# Patient Record
Sex: FEMALE | Race: BLACK OR AFRICAN AMERICAN | NOT HISPANIC OR LATINO | Employment: OTHER | URBAN - METROPOLITAN AREA
[De-identification: names, ages, dates, MRNs, and addresses within clinical notes are randomized per-mention and may not be internally consistent; named-entity substitution may affect disease eponyms.]

---

## 2023-02-27 LAB
ANION GAP IN SER/PLAS: 13 MMOL/L (ref 10–20)
CALCIUM (MG/DL) IN SER/PLAS: 10.3 MG/DL (ref 8.6–10.6)
CARBON DIOXIDE, TOTAL (MMOL/L) IN SER/PLAS: 29 MMOL/L (ref 21–32)
CHLORIDE (MMOL/L) IN SER/PLAS: 102 MMOL/L (ref 98–107)
CHOLESTEROL (MG/DL) IN SER/PLAS: 209 MG/DL (ref 0–199)
CHOLESTEROL IN HDL (MG/DL) IN SER/PLAS: 68.6 MG/DL
CHOLESTEROL/HDL RATIO: 3
CREATININE (MG/DL) IN SER/PLAS: 1.28 MG/DL (ref 0.5–1.05)
GFR FEMALE: 43 ML/MIN/1.73M2
GLUCOSE (MG/DL) IN SER/PLAS: 70 MG/DL (ref 74–99)
LDL: 118 MG/DL (ref 0–99)
POTASSIUM (MMOL/L) IN SER/PLAS: 4.4 MMOL/L (ref 3.5–5.3)
SODIUM (MMOL/L) IN SER/PLAS: 140 MMOL/L (ref 136–145)
TRIGLYCERIDE (MG/DL) IN SER/PLAS: 114 MG/DL (ref 0–149)
UREA NITROGEN (MG/DL) IN SER/PLAS: 20 MG/DL (ref 6–23)
VLDL: 23 MG/DL (ref 0–40)

## 2023-04-03 ENCOUNTER — OFFICE VISIT (OUTPATIENT)
Dept: PRIMARY CARE | Facility: CLINIC | Age: 78
End: 2023-04-03
Payer: MEDICARE

## 2023-04-03 VITALS
SYSTOLIC BLOOD PRESSURE: 158 MMHG | HEIGHT: 62 IN | HEART RATE: 68 BPM | TEMPERATURE: 97.6 F | DIASTOLIC BLOOD PRESSURE: 82 MMHG | WEIGHT: 221 LBS | BODY MASS INDEX: 40.67 KG/M2 | OXYGEN SATURATION: 97 %

## 2023-04-03 DIAGNOSIS — I10 HYPERTENSION WITH GOAL BLOOD PRESSURE LESS THAN 130/80: Primary | ICD-10-CM

## 2023-04-03 DIAGNOSIS — R79.89 ABNORMAL BLOOD CREATININE LEVEL: ICD-10-CM

## 2023-04-03 DIAGNOSIS — G89.29 CHRONIC MUSCULOSKELETAL PAIN: ICD-10-CM

## 2023-04-03 DIAGNOSIS — M79.18 CHRONIC MUSCULOSKELETAL PAIN: ICD-10-CM

## 2023-04-03 PROBLEM — R10.10 PAIN OF UPPER ABDOMEN: Status: ACTIVE | Noted: 2023-04-03

## 2023-04-03 PROBLEM — H18.519 CORNEA GUTTATA: Status: ACTIVE | Noted: 2023-04-03

## 2023-04-03 PROBLEM — R82.90 ABNORMAL URINALYSIS: Status: ACTIVE | Noted: 2023-04-03

## 2023-04-03 PROBLEM — R22.1 NECK MASS: Status: ACTIVE | Noted: 2023-04-03

## 2023-04-03 PROBLEM — H18.603 KERATOCONUS OF BOTH EYES: Status: ACTIVE | Noted: 2023-04-03

## 2023-04-03 PROBLEM — K59.00 CONSTIPATION: Status: ACTIVE | Noted: 2023-04-03

## 2023-04-03 PROBLEM — R26.9 GAIT ABNORMALITY: Status: ACTIVE | Noted: 2023-04-03

## 2023-04-03 PROBLEM — R44.8 FACIAL PRESSURE: Status: ACTIVE | Noted: 2023-04-03

## 2023-04-03 PROBLEM — H25.813 COMBINED FORM OF AGE-RELATED CATARACT, BOTH EYES: Status: ACTIVE | Noted: 2023-04-03

## 2023-04-03 PROBLEM — S39.012A LUMBAR STRAIN: Status: ACTIVE | Noted: 2023-04-03

## 2023-04-03 PROBLEM — M54.50 CHRONIC BILATERAL LOW BACK PAIN WITHOUT SCIATICA: Status: ACTIVE | Noted: 2023-04-03

## 2023-04-03 PROBLEM — M79.606 LOWER EXTREMITY PAIN: Status: ACTIVE | Noted: 2023-04-03

## 2023-04-03 PROBLEM — M85.832 OSTEOPENIA OF LEFT FOREARM: Status: ACTIVE | Noted: 2023-04-03

## 2023-04-03 PROBLEM — H52.00 HYPEROPIA WITH PRESBYOPIA: Status: ACTIVE | Noted: 2023-04-03

## 2023-04-03 PROBLEM — R69 TAKING MULTIPLE MEDICATIONS FOR CHRONIC DISEASE: Status: ACTIVE | Noted: 2023-04-03

## 2023-04-03 PROBLEM — G47.30 SLEEP DISORDER BREATHING: Status: ACTIVE | Noted: 2023-04-03

## 2023-04-03 PROBLEM — H52.213 IRREGULAR ASTIGMATISM OF BOTH EYES: Status: ACTIVE | Noted: 2023-04-03

## 2023-04-03 PROBLEM — M54.6 RIGHT-SIDED THORACIC BACK PAIN: Status: ACTIVE | Noted: 2023-04-03

## 2023-04-03 PROBLEM — R06.83 SNORING: Status: ACTIVE | Noted: 2023-04-03

## 2023-04-03 PROBLEM — G47.19 EXCESSIVE DAYTIME SLEEPINESS: Status: ACTIVE | Noted: 2023-04-03

## 2023-04-03 PROBLEM — E07.9 THYROID DISORDER: Status: ACTIVE | Noted: 2023-04-03

## 2023-04-03 PROBLEM — R19.8 ABNORMAL BOWEL HABITS: Status: ACTIVE | Noted: 2023-04-03

## 2023-04-03 PROBLEM — R13.10 DYSPHAGIA: Status: ACTIVE | Noted: 2023-04-03

## 2023-04-03 PROBLEM — E04.1 THYROID NODULE: Status: ACTIVE | Noted: 2023-04-03

## 2023-04-03 PROBLEM — R06.09 DYSPNEA ON EXERTION: Status: ACTIVE | Noted: 2023-04-03

## 2023-04-03 PROBLEM — M06.9 RHEUMATOID ARTHRITIS (MULTI): Status: ACTIVE | Noted: 2023-04-03

## 2023-04-03 PROBLEM — E66.813 CLASS 3 SEVERE OBESITY WITHOUT SERIOUS COMORBIDITY WITH BODY MASS INDEX (BMI) OF 40.0 TO 44.9 IN ADULT: Status: ACTIVE | Noted: 2023-04-03

## 2023-04-03 PROBLEM — R93.0 ABNORMAL MRI OF HEAD: Status: ACTIVE | Noted: 2023-04-03

## 2023-04-03 PROBLEM — H18.519 FUCHS' ENDOTHELIAL DYSTROPHY: Status: ACTIVE | Noted: 2023-04-03

## 2023-04-03 PROBLEM — R19.4 BOWEL HABIT CHANGES: Status: ACTIVE | Noted: 2023-04-03

## 2023-04-03 PROBLEM — R91.8 ABNORMAL LUNG FIELD: Status: ACTIVE | Noted: 2023-04-03

## 2023-04-03 PROBLEM — H25.13 NUCLEAR SENILE CATARACT OF BOTH EYES: Status: ACTIVE | Noted: 2023-04-03

## 2023-04-03 PROBLEM — Z86.010 HISTORY OF ADENOMATOUS POLYP OF COLON: Status: ACTIVE | Noted: 2023-04-03

## 2023-04-03 PROBLEM — R11.0 NAUSEA IN ADULT: Status: ACTIVE | Noted: 2023-04-03

## 2023-04-03 PROBLEM — S05.8X2A: Status: ACTIVE | Noted: 2023-04-03

## 2023-04-03 PROBLEM — I51.9 DIASTOLIC DYSFUNCTION, LEFT VENTRICLE: Status: ACTIVE | Noted: 2023-04-03

## 2023-04-03 PROBLEM — M16.10 ARTHRITIS OF HIP: Status: ACTIVE | Noted: 2023-04-03

## 2023-04-03 PROBLEM — R51.9 CHRONIC NONINTRACTABLE HEADACHE: Status: ACTIVE | Noted: 2023-04-03

## 2023-04-03 PROBLEM — M54.50 BACK PAIN, LUMBOSACRAL: Status: ACTIVE | Noted: 2023-04-03

## 2023-04-03 PROBLEM — Z86.39 HISTORY OF DIABETES MELLITUS, TYPE II: Status: ACTIVE | Noted: 2023-04-03

## 2023-04-03 PROBLEM — K14.0 GLOSSITIS: Status: ACTIVE | Noted: 2023-04-03

## 2023-04-03 PROBLEM — R93.89 CHEST X-RAY ABNORMALITY: Status: ACTIVE | Noted: 2023-04-03

## 2023-04-03 PROBLEM — R73.03 PRE-DIABETES: Status: ACTIVE | Noted: 2023-04-03

## 2023-04-03 PROBLEM — H52.00 HYPEROPIA: Status: ACTIVE | Noted: 2023-04-03

## 2023-04-03 PROBLEM — R06.02 SHORTNESS OF BREATH: Status: ACTIVE | Noted: 2023-04-03

## 2023-04-03 PROBLEM — E66.01 CLASS 3 SEVERE OBESITY WITHOUT SERIOUS COMORBIDITY WITH BODY MASS INDEX (BMI) OF 40.0 TO 44.9 IN ADULT (MULTI): Status: ACTIVE | Noted: 2023-04-03

## 2023-04-03 PROBLEM — E16.2 HYPOGLYCEMIA: Status: ACTIVE | Noted: 2023-04-03

## 2023-04-03 PROBLEM — R59.0 CERVICAL ADENOPATHY: Status: ACTIVE | Noted: 2023-04-03

## 2023-04-03 PROBLEM — M54.6 CHRONIC BILATERAL THORACIC BACK PAIN: Status: ACTIVE | Noted: 2023-04-03

## 2023-04-03 PROBLEM — M17.11 OSTEOARTHRITIS OF RIGHT KNEE: Status: ACTIVE | Noted: 2023-04-03

## 2023-04-03 PROBLEM — H16.223 BILATERAL KERATITIS SICCA: Status: ACTIVE | Noted: 2023-04-03

## 2023-04-03 PROBLEM — R09.82 POSTNASAL DRIP: Status: ACTIVE | Noted: 2023-04-03

## 2023-04-03 PROBLEM — L02.91 ABSCESS: Status: ACTIVE | Noted: 2023-04-03

## 2023-04-03 PROBLEM — Z86.0101 HISTORY OF ADENOMATOUS POLYP OF COLON: Status: ACTIVE | Noted: 2023-04-03
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
## 2023-04-03 PROBLEM — R82.90 ABNORMAL URINE: Status: ACTIVE | Noted: 2023-04-03

## 2023-04-03 PROBLEM — R73.9 HYPERGLYCEMIA: Status: ACTIVE | Noted: 2023-04-03

## 2023-04-03 PROBLEM — D12.6 TUBULAR ADENOMA OF COLON: Status: ACTIVE | Noted: 2023-04-03

## 2023-04-03 PROBLEM — R15.0 INCOMPLETE PASSAGE OF STOOL: Status: ACTIVE | Noted: 2023-04-03

## 2023-04-03 PROBLEM — G47.30 OBSERVED SLEEP APNEA: Status: ACTIVE | Noted: 2023-04-03

## 2023-04-03 PROBLEM — H04.129 DRY EYE SYNDROME: Status: ACTIVE | Noted: 2023-04-03

## 2023-04-03 PROBLEM — M85.852 OSTEOPENIA OF NECK OF LEFT FEMUR: Status: ACTIVE | Noted: 2023-04-03

## 2023-04-03 PROBLEM — M35.01 BILATERAL KERATITIS SICCA (MULTI): Status: ACTIVE | Noted: 2023-04-03

## 2023-04-03 PROBLEM — R15.9 INCONTINENCE OF FECES: Status: ACTIVE | Noted: 2023-04-03

## 2023-04-03 PROBLEM — Z20.822 EXPOSURE TO COVID-19 VIRUS: Status: ACTIVE | Noted: 2023-04-03

## 2023-04-03 PROBLEM — E66.01 MORBID OBESITY (MULTI): Status: ACTIVE | Noted: 2023-04-03

## 2023-04-03 PROBLEM — E04.2 MULTINODULAR GOITER: Status: ACTIVE | Noted: 2023-04-03

## 2023-04-03 PROBLEM — G47.61 PERIODIC LIMB MOVEMENT: Status: ACTIVE | Noted: 2023-04-03

## 2023-04-03 PROBLEM — R94.2 ABNORMAL LUNG SCAN: Status: ACTIVE | Noted: 2023-04-03

## 2023-04-03 PROBLEM — H25.10 NUCLEAR SCLEROSIS: Status: ACTIVE | Noted: 2023-04-03

## 2023-04-03 PROBLEM — K21.9 ESOPHAGEAL REFLUX: Status: ACTIVE | Noted: 2023-04-03

## 2023-04-03 PROBLEM — I83.90 VARICOSE VEIN OF LEG: Status: ACTIVE | Noted: 2023-04-03

## 2023-04-03 PROBLEM — R19.8 RECTAL PRESSURE: Status: ACTIVE | Noted: 2023-04-03

## 2023-04-03 PROBLEM — J31.0 CHRONIC RHINITIS: Status: ACTIVE | Noted: 2023-04-03

## 2023-04-03 PROBLEM — R19.5 ABNORMAL STOOL CALIBER: Status: ACTIVE | Noted: 2023-04-03

## 2023-04-03 PROBLEM — Z86.19 HISTORY OF HERPES GENITALIS: Status: ACTIVE | Noted: 2023-04-03

## 2023-04-03 PROBLEM — R00.2 PALPITATIONS: Status: ACTIVE | Noted: 2023-04-03

## 2023-04-03 PROBLEM — M54.9 INTRACTABLE BACK PAIN: Status: ACTIVE | Noted: 2023-04-03

## 2023-04-03 PROBLEM — M25.561 RIGHT KNEE PAIN: Status: ACTIVE | Noted: 2023-04-03

## 2023-04-03 PROBLEM — H52.4 HYPEROPIA WITH PRESBYOPIA: Status: ACTIVE | Noted: 2023-04-03

## 2023-04-03 PROCEDURE — 1036F TOBACCO NON-USER: CPT | Performed by: FAMILY MEDICINE

## 2023-04-03 PROCEDURE — 3077F SYST BP >= 140 MM HG: CPT | Performed by: FAMILY MEDICINE

## 2023-04-03 PROCEDURE — 3079F DIAST BP 80-89 MM HG: CPT | Performed by: FAMILY MEDICINE

## 2023-04-03 PROCEDURE — 1160F RVW MEDS BY RX/DR IN RCRD: CPT | Performed by: FAMILY MEDICINE

## 2023-04-03 PROCEDURE — 1159F MED LIST DOCD IN RCRD: CPT | Performed by: FAMILY MEDICINE

## 2023-04-03 PROCEDURE — 99213 OFFICE O/P EST LOW 20 MIN: CPT | Performed by: FAMILY MEDICINE

## 2023-04-03 RX ORDER — LOSARTAN POTASSIUM AND HYDROCHLOROTHIAZIDE 12.5; 5 MG/1; MG/1
1 TABLET ORAL DAILY
COMMUNITY
End: 2023-07-03 | Stop reason: SDUPTHER

## 2023-04-03 RX ORDER — ERGOCALCIFEROL 1.25 MG/1
1 CAPSULE ORAL
COMMUNITY
Start: 2020-10-14 | End: 2023-07-03 | Stop reason: SDUPTHER

## 2023-04-03 RX ORDER — IBUPROFEN 100 MG/5ML
1 SUSPENSION, ORAL (FINAL DOSE FORM) ORAL DAILY
COMMUNITY
Start: 2020-06-22

## 2023-04-03 RX ORDER — ASPIRIN 81 MG/1
1 TABLET ORAL DAILY
COMMUNITY
Start: 2015-03-31

## 2023-04-03 RX ORDER — VITAMIN E MIXED 400 UNIT
CAPSULE ORAL DAILY
COMMUNITY
End: 2024-02-05 | Stop reason: ALTCHOICE

## 2023-04-03 RX ORDER — DEXTROMETHORPHAN HYDROBROMIDE, GUAIFENESIN 5; 100 MG/5ML; MG/5ML
LIQUID ORAL
COMMUNITY
Start: 2020-12-09

## 2023-04-03 ASSESSMENT — PAIN SCALES - GENERAL: PAINLEVEL: 10-WORST PAIN EVER

## 2023-04-03 NOTE — PROGRESS NOTES
"  Subjective   Chief complaint: Celeste Murray is a 78 y.o. female who presents for Follow-up (Pt is here for a follow-up.).    HPI:  HPI:  History was provided by patient,since last seen for Hypertension,patient denies chest pain,SHortness of breath,Palpitation,Headache and blurry vision.  But complains of -right shoulder pain, she reports that she was recently seen in the emergency room for it, currently being followed for a workmen's comp injury after falling at work.  She reports pain is severe and interferes with her activities.  Pain is worse with moving her shoulder.  She is currently in therapy.      Ros:  Patient denies  Shortness of breath , chest pain  Nausea vomiting and diarrhea  No Dysuria      Objective   /82 (BP Location: Left arm, Patient Position: Sitting, BP Cuff Size: Large adult)   Pulse 68   Temp 36.4 °C (97.6 °F) (Temporal)   Ht 1.575 m (5' 2\")   Wt 100 kg (221 lb)   SpO2 97%   BMI 40.42 kg/m²       General appearance: alert and oriented, in no acute distress  Eyes: conjunctivae/corneas clear. PERRL, EOM's intact.   Neck: no adenopathy, no carotid bruit, supple, symmetrical, trachea midline, and thyroid not enlarged, symmetric, no tenderness/mass/nodules  Lungs: clear to auscultation bilaterally  Chest wall: no tenderness  Heart: regular rate and rhythm, S1, S2 normal, no murmur, click, rub or gallop  Rt Shoulder: ROM limited in all planes??      I have reviewed and reconciled the medication list with the patient today.   Current Outpatient Medications:     acetaminophen (Tylenol 8 Hour) 650 mg ER tablet, Take by mouth. Use as directed, Disp: , Rfl:     ascorbic acid (Vitamin C) 1,000 mg tablet, Take 1 tablet (1,000 mg) by mouth once daily., Disp: , Rfl:     aspirin 81 mg EC tablet, Take 1 tablet (81 mg) by mouth once daily. As directed, Disp: , Rfl:     cyclobenzaprine HCl (FLEXERIL ORAL), Take by mouth. Use as directed, Disp: , Rfl:     ergocalciferol (Vitamin D-2) 1.25 MG (54238 " UT) capsule, Take 1 capsule (1,250 mcg) by mouth 1 (one) time per week., Disp: , Rfl:     losartan-hydrochlorothiazide (Hyzaar) 50-12.5 mg tablet, Take 1 tablet by mouth once daily., Disp: , Rfl:     ONE DAILY MULTIVITAMIN ORAL, Take 1 tablet by mouth once daily., Disp: , Rfl:     vitamin E 450 mg (1000 unit) capsule, Take by mouth once daily. Use as directed, Disp: , Rfl:     ZINC ORAL, Take by mouth. Take as directed, Disp: , Rfl:      Imaging:  No results found.     Labs reviewed:    Lab Results   Component Value Date    WBC 6.0 08/24/2022    HGB 13.8 08/24/2022    HCT 42.6 08/24/2022     08/24/2022    CHOL 209 (H) 02/27/2023    TRIG 114 02/27/2023    HDL 68.6 02/27/2023    LDLDIRECT 84 06/04/2021    ALT 19 12/07/2022    AST 18 12/07/2022     02/27/2023    K 4.4 02/27/2023     02/27/2023    CREATININE 1.28 (H) 02/27/2023    BUN 20 02/27/2023    CO2 29 02/27/2023    TSH 0.56 12/07/2022    INR 1.2 (H) 01/21/2021    HGBA1C 6.1 (A) 12/07/2022         Assessment/Plan       Diagnoses and all orders for this visit:  Hypertension with goal blood pressure less than 130/80  -     Renal function panel; Future  Patient reports that she is here to follow-up on her creatinine which is elevated, we discussed my intention to increase her blood pressure medication but she declined.  We will continue with her current losartan hydrochlorothiazide.  Abnormal blood creatinine level   Discussed avoiding nephrotoxic drugs including nonsteroidal anti-inflammatories, suggested to increase blood pressure medication but she declined.    Chronic musculoskeletal pain :  Advised to continue therapy, use Tylenol and as needed and follow-up with her Workmen's Comp. doc.    Diagnosis and Management discussed with the patient.  Patient agreeable with plan.  Patient advised to Return to clinic with new or unresolved symptoms.  Rex Said MD    This note was partially generated using the Dragon Voice Recognition System and there  may be some incorrect words or wording, spelling and /or spelling errors, or punctuation errors that were not corrected prior to committing the note to the medical record.

## 2023-05-05 ENCOUNTER — APPOINTMENT (OUTPATIENT)
Dept: LAB | Facility: LAB | Age: 78
End: 2023-05-05
Payer: MEDICARE

## 2023-05-05 ENCOUNTER — OFFICE VISIT (OUTPATIENT)
Dept: PRIMARY CARE | Facility: CLINIC | Age: 78
End: 2023-05-05
Payer: MEDICARE

## 2023-05-05 ENCOUNTER — LAB (OUTPATIENT)
Dept: LAB | Facility: LAB | Age: 78
End: 2023-05-05
Payer: MEDICARE

## 2023-05-05 VITALS
WEIGHT: 223 LBS | OXYGEN SATURATION: 96 % | HEIGHT: 62 IN | DIASTOLIC BLOOD PRESSURE: 81 MMHG | BODY MASS INDEX: 41.04 KG/M2 | HEART RATE: 72 BPM | SYSTOLIC BLOOD PRESSURE: 142 MMHG | TEMPERATURE: 98.2 F

## 2023-05-05 DIAGNOSIS — I10 HYPERTENSION WITH GOAL BLOOD PRESSURE LESS THAN 130/80: Primary | ICD-10-CM

## 2023-05-05 DIAGNOSIS — I10 HYPERTENSION WITH GOAL BLOOD PRESSURE LESS THAN 130/80: ICD-10-CM

## 2023-05-05 PROBLEM — N18.1 STAGE 1 CHRONIC KIDNEY DISEASE: Status: ACTIVE | Noted: 2023-05-05

## 2023-05-05 LAB
ANION GAP IN SER/PLAS: 11 MMOL/L (ref 10–20)
CALCIUM (MG/DL) IN SER/PLAS: 9.9 MG/DL (ref 8.6–10.6)
CARBON DIOXIDE, TOTAL (MMOL/L) IN SER/PLAS: 28 MMOL/L (ref 21–32)
CHLORIDE (MMOL/L) IN SER/PLAS: 104 MMOL/L (ref 98–107)
CREATININE (MG/DL) IN SER/PLAS: 0.88 MG/DL (ref 0.5–1.05)
GFR FEMALE: 68 ML/MIN/1.73M2
GLUCOSE (MG/DL) IN SER/PLAS: 69 MG/DL (ref 74–99)
POTASSIUM (MMOL/L) IN SER/PLAS: 4.2 MMOL/L (ref 3.5–5.3)
SODIUM (MMOL/L) IN SER/PLAS: 139 MMOL/L (ref 136–145)
UREA NITROGEN (MG/DL) IN SER/PLAS: 22 MG/DL (ref 6–23)

## 2023-05-05 PROCEDURE — 99213 OFFICE O/P EST LOW 20 MIN: CPT | Performed by: FAMILY MEDICINE

## 2023-05-05 PROCEDURE — 1159F MED LIST DOCD IN RCRD: CPT | Performed by: FAMILY MEDICINE

## 2023-05-05 PROCEDURE — 36415 COLL VENOUS BLD VENIPUNCTURE: CPT

## 2023-05-05 PROCEDURE — 1160F RVW MEDS BY RX/DR IN RCRD: CPT | Performed by: FAMILY MEDICINE

## 2023-05-05 PROCEDURE — 3079F DIAST BP 80-89 MM HG: CPT | Performed by: FAMILY MEDICINE

## 2023-05-05 PROCEDURE — 1036F TOBACCO NON-USER: CPT | Performed by: FAMILY MEDICINE

## 2023-05-05 PROCEDURE — 3077F SYST BP >= 140 MM HG: CPT | Performed by: FAMILY MEDICINE

## 2023-05-05 PROCEDURE — 80048 BASIC METABOLIC PNL TOTAL CA: CPT

## 2023-05-05 RX ORDER — FLUTICASONE PROPIONATE 50 MCG
1 SPRAY, SUSPENSION (ML) NASAL
COMMUNITY

## 2023-05-05 RX ORDER — LOSARTAN POTASSIUM 100 MG/1
TABLET ORAL
COMMUNITY
Start: 2020-12-24 | End: 2023-05-05 | Stop reason: ALTCHOICE

## 2023-05-05 RX ORDER — TRIAMTERENE AND HYDROCHLOROTHIAZIDE 75; 50 MG/1; MG/1
TABLET ORAL EVERY 24 HOURS
COMMUNITY
End: 2023-05-05 | Stop reason: ALTCHOICE

## 2023-05-05 RX ORDER — CALCIUM CARBONATE 600 MG
600 TABLET ORAL
COMMUNITY
Start: 2013-01-08

## 2023-05-05 ASSESSMENT — PAIN SCALES - GENERAL: PAINLEVEL: 4

## 2023-05-05 NOTE — PROGRESS NOTES
"Subjective   Patient ID: Celeste Murray is a 78 y.o. who presents for Follow-up.  HPI    HTN  - No headaches, chest pain, shortness of breath, vision changes, dizziness  - Taking losartan-hydrochlorothiazide without issue  - Working on increasing exercise  - Due for repeat creatinine     Fall at work  - Continues to have arm pain- R shoulder   - Following with worker comp doc  - Has plans for MRI. PT has been put on hold until this is done    Review of Systems  10 point review of systems negative except as noted in HPI.    Objective     /81 (BP Location: Left arm, Patient Position: Sitting)   Pulse 72   Temp 36.8 °C (98.2 °F) (Temporal)   Ht 1.575 m (5' 2\")   Wt 101 kg (223 lb)   SpO2 96%   BMI 40.79 kg/m²   General: well appearing, no distress  Neck: No lymphadenopathy, no thyromegaly  CV: Regular rate and rhythm, no murmur  Lungs: Clear to auscultation bilaterally  Extremities: No edema noted, wearing compression stockings   Psych: Appropriate mood and affect     Assessment/Plan   79 yo F here for follow-up hypertension    Hypertension  - Continue losartan-hydrochlorothiazide  - Check BMP    Shoulder pain  - Follow up workers comp    RTC 3 months or sooner as needed   "

## 2023-06-12 LAB
ALANINE AMINOTRANSFERASE (SGPT) (U/L) IN SER/PLAS: 18 U/L (ref 7–45)
ALBUMIN (G/DL) IN SER/PLAS: 4.2 G/DL (ref 3.4–5)
ALKALINE PHOSPHATASE (U/L) IN SER/PLAS: 90 U/L (ref 33–136)
ANION GAP IN SER/PLAS: 10 MMOL/L (ref 10–20)
ASPARTATE AMINOTRANSFERASE (SGOT) (U/L) IN SER/PLAS: 20 U/L (ref 9–39)
BILIRUBIN TOTAL (MG/DL) IN SER/PLAS: 0.5 MG/DL (ref 0–1.2)
CALCIDIOL (25 OH VITAMIN D3) (NG/ML) IN SER/PLAS: 46 NG/ML
CALCIUM (MG/DL) IN SER/PLAS: 9.6 MG/DL (ref 8.6–10.6)
CARBON DIOXIDE, TOTAL (MMOL/L) IN SER/PLAS: 28 MMOL/L (ref 21–32)
CHLORIDE (MMOL/L) IN SER/PLAS: 108 MMOL/L (ref 98–107)
CREATININE (MG/DL) IN SER/PLAS: 1.24 MG/DL (ref 0.5–1.05)
ESTIMATED AVERAGE GLUCOSE FOR HBA1C: 128 MG/DL
GFR FEMALE: 45 ML/MIN/1.73M2
GLUCOSE (MG/DL) IN SER/PLAS: 75 MG/DL (ref 74–99)
HEMOGLOBIN A1C/HEMOGLOBIN TOTAL IN BLOOD: 6.1 %
PARATHYRIN INTACT (PG/ML) IN SER/PLAS: 40.4 PG/ML (ref 18.5–88)
POTASSIUM (MMOL/L) IN SER/PLAS: 4.4 MMOL/L (ref 3.5–5.3)
PROTEIN TOTAL: 7.1 G/DL (ref 6.4–8.2)
SODIUM (MMOL/L) IN SER/PLAS: 142 MMOL/L (ref 136–145)
THYROTROPIN (MIU/L) IN SER/PLAS BY DETECTION LIMIT <= 0.05 MIU/L: 0.41 MIU/L (ref 0.44–3.98)
THYROXINE (T4) FREE (NG/DL) IN SER/PLAS: 0.9 NG/DL (ref 0.78–1.48)
UREA NITROGEN (MG/DL) IN SER/PLAS: 21 MG/DL (ref 6–23)

## 2023-06-22 ENCOUNTER — TELEPHONE (OUTPATIENT)
Dept: PRIMARY CARE | Facility: CLINIC | Age: 78
End: 2023-06-22

## 2023-06-29 ENCOUNTER — LAB (OUTPATIENT)
Dept: LAB | Facility: LAB | Age: 78
End: 2023-06-29
Payer: MEDICARE

## 2023-06-29 DIAGNOSIS — I10 HYPERTENSION WITH GOAL BLOOD PRESSURE LESS THAN 130/80: ICD-10-CM

## 2023-06-29 LAB
ALBUMIN (G/DL) IN SER/PLAS: 4.2 G/DL (ref 3.4–5)
ANION GAP IN SER/PLAS: 11 MMOL/L (ref 10–20)
CALCIUM (MG/DL) IN SER/PLAS: 9.6 MG/DL (ref 8.6–10.6)
CARBON DIOXIDE, TOTAL (MMOL/L) IN SER/PLAS: 28 MMOL/L (ref 21–32)
CHLORIDE (MMOL/L) IN SER/PLAS: 105 MMOL/L (ref 98–107)
CREATININE (MG/DL) IN SER/PLAS: 0.88 MG/DL (ref 0.5–1.05)
GFR FEMALE: 68 ML/MIN/1.73M2
GLUCOSE (MG/DL) IN SER/PLAS: 80 MG/DL (ref 74–99)
PHOSPHATE (MG/DL) IN SER/PLAS: 3.1 MG/DL (ref 2.5–4.9)
POTASSIUM (MMOL/L) IN SER/PLAS: 4.6 MMOL/L (ref 3.5–5.3)
SODIUM (MMOL/L) IN SER/PLAS: 139 MMOL/L (ref 136–145)
UREA NITROGEN (MG/DL) IN SER/PLAS: 17 MG/DL (ref 6–23)

## 2023-06-29 PROCEDURE — 80069 RENAL FUNCTION PANEL: CPT

## 2023-06-29 PROCEDURE — 36415 COLL VENOUS BLD VENIPUNCTURE: CPT

## 2023-07-03 DIAGNOSIS — I10 UNCONTROLLED HYPERTENSION: Primary | ICD-10-CM

## 2023-07-03 DIAGNOSIS — E55.9 VITAMIN D DEFICIENCY: ICD-10-CM

## 2023-07-03 RX ORDER — ERGOCALCIFEROL 1.25 MG/1
1 CAPSULE ORAL
Qty: 12 CAPSULE | Refills: 2 | Status: SHIPPED | OUTPATIENT
Start: 2023-07-03 | End: 2024-02-05 | Stop reason: ALTCHOICE

## 2023-07-03 RX ORDER — LOSARTAN POTASSIUM AND HYDROCHLOROTHIAZIDE 12.5; 5 MG/1; MG/1
1 TABLET ORAL DAILY
Qty: 90 TABLET | Refills: 2 | Status: SHIPPED | OUTPATIENT
Start: 2023-07-03

## 2023-07-03 NOTE — TELEPHONE ENCOUNTER
PT is calling for a refill of their Vitamin D and her Blood pressure medication and also asks for a call back again.

## 2023-07-17 LAB
APPEARANCE, URINE: CLEAR
BILIRUBIN, URINE: NEGATIVE
BLOOD, URINE: NEGATIVE
COLOR, URINE: YELLOW
GLUCOSE, URINE: NEGATIVE MG/DL
KETONES, URINE: NEGATIVE MG/DL
LEUKOCYTE ESTERASE, URINE: NEGATIVE
NITRITE, URINE: NEGATIVE
PH, URINE: 5 (ref 5–8)
PROTEIN, URINE: NEGATIVE MG/DL
SPECIFIC GRAVITY, URINE: 1.02 (ref 1–1.03)
UROBILINOGEN, URINE: <2 MG/DL (ref 0–1.9)

## 2023-07-18 LAB — URINE CULTURE: NORMAL

## 2023-12-06 PROBLEM — M19.012 ARTHRITIS OF LEFT SHOULDER REGION: Status: ACTIVE | Noted: 2023-02-15

## 2023-12-06 PROBLEM — R35.0 URINARY FREQUENCY: Status: ACTIVE | Noted: 2023-12-06

## 2023-12-06 PROBLEM — A60.00 GENITAL HERPES SIMPLEX: Status: ACTIVE | Noted: 2023-12-06

## 2023-12-06 RX ORDER — LOSARTAN POTASSIUM 25 MG/1
TABLET ORAL
COMMUNITY
End: 2023-12-07 | Stop reason: SDUPTHER

## 2023-12-06 RX ORDER — TRIAMTERENE AND HYDROCHLOROTHIAZIDE 75; 50 MG/1; MG/1
TABLET ORAL EVERY 24 HOURS
COMMUNITY
End: 2023-12-07 | Stop reason: SDUPTHER

## 2023-12-06 RX ORDER — ZINC SULFATE 50(220)MG
50 CAPSULE ORAL DAILY
COMMUNITY

## 2023-12-06 RX ORDER — MELOXICAM 7.5 MG/1
7.5 TABLET ORAL DAILY PRN
COMMUNITY
Start: 2023-10-23 | End: 2024-02-05 | Stop reason: ALTCHOICE

## 2023-12-06 RX ORDER — MULTIVITAMIN
1 TABLET ORAL DAILY
COMMUNITY

## 2023-12-07 ENCOUNTER — OFFICE VISIT (OUTPATIENT)
Dept: CARDIOLOGY | Facility: HOSPITAL | Age: 78
End: 2023-12-07
Payer: MEDICARE

## 2023-12-07 VITALS
HEIGHT: 62 IN | WEIGHT: 232.13 LBS | BODY MASS INDEX: 42.72 KG/M2 | OXYGEN SATURATION: 98 % | SYSTOLIC BLOOD PRESSURE: 152 MMHG | DIASTOLIC BLOOD PRESSURE: 88 MMHG | HEART RATE: 78 BPM

## 2023-12-07 DIAGNOSIS — I10 HYPERTENSION, UNSPECIFIED TYPE: ICD-10-CM

## 2023-12-07 DIAGNOSIS — R06.09 DYSPNEA ON EXERTION: Primary | ICD-10-CM

## 2023-12-07 PROCEDURE — 1036F TOBACCO NON-USER: CPT | Performed by: INTERNAL MEDICINE

## 2023-12-07 PROCEDURE — 99214 OFFICE O/P EST MOD 30 MIN: CPT | Performed by: INTERNAL MEDICINE

## 2023-12-07 PROCEDURE — 3077F SYST BP >= 140 MM HG: CPT | Performed by: INTERNAL MEDICINE

## 2023-12-07 PROCEDURE — 1126F AMNT PAIN NOTED NONE PRSNT: CPT | Performed by: INTERNAL MEDICINE

## 2023-12-07 PROCEDURE — 1160F RVW MEDS BY RX/DR IN RCRD: CPT | Performed by: INTERNAL MEDICINE

## 2023-12-07 PROCEDURE — 93005 ELECTROCARDIOGRAM TRACING: CPT | Performed by: INTERNAL MEDICINE

## 2023-12-07 PROCEDURE — 3079F DIAST BP 80-89 MM HG: CPT | Performed by: INTERNAL MEDICINE

## 2023-12-07 PROCEDURE — 1159F MED LIST DOCD IN RCRD: CPT | Performed by: INTERNAL MEDICINE

## 2023-12-07 RX ORDER — CYCLOBENZAPRINE HCL 10 MG
10 TABLET ORAL NIGHTLY PRN
COMMUNITY
Start: 2023-11-06 | End: 2024-05-20 | Stop reason: SDUPTHER

## 2023-12-07 ASSESSMENT — ENCOUNTER SYMPTOMS
OCCASIONAL FEELINGS OF UNSTEADINESS: 1
DEPRESSION: 0
LOSS OF SENSATION IN FEET: 1

## 2023-12-07 ASSESSMENT — COLUMBIA-SUICIDE SEVERITY RATING SCALE - C-SSRS
6. HAVE YOU EVER DONE ANYTHING, STARTED TO DO ANYTHING, OR PREPARED TO DO ANYTHING TO END YOUR LIFE?: NO
2. HAVE YOU ACTUALLY HAD ANY THOUGHTS OF KILLING YOURSELF?: NO
1. IN THE PAST MONTH, HAVE YOU WISHED YOU WERE DEAD OR WISHED YOU COULD GO TO SLEEP AND NOT WAKE UP?: NO

## 2023-12-07 ASSESSMENT — PAIN SCALES - GENERAL: PAINLEVEL: 0-NO PAIN

## 2023-12-07 NOTE — PROGRESS NOTES
Subjective   Known case of hypertension and obesity  Former smoker  No other major comorbidities except backache  Walks with using a walker  Echocardiogram in September 2022 low normal LVEF with some regional wall motion abnormality    Lives alone, works part-time 5 days of every 2 weeks  No drug or alcohol abuse history      Chief Complaint:  Patient is here for follow-up visit    HPI  Past visits seen mainly because of dyspnea on exertion.  No resting dyspnea.  No chest pain.  No palpitation.  Had chronic left lower extremity edema.  No history of syncope.  So far I have seen patient 3 times and no change in complaints.  Advised patient previously to get a cardiac MRI and cardiac CTA which she refused.    Today's visit no change in symptoms.  Still complains of dyspnea on exertion.  No progression of symptoms since last visit.      ROS  No other major complaints    Objective   Constitutional:       Appearance: Healthy appearance.   Neck:      Vascular: JVD normal.   Pulmonary:      Effort: Pulmonary effort is normal.      Breath sounds: Normal breath sounds.   Cardiovascular:      PMI at left midclavicular line. Normal rate. Regular rhythm. Normal S1. Normal S2.       Murmurs: There is no murmur.   Edema:     Peripheral edema present.     Pretibial: 1+ edema of the left pretibial area.  Abdominal:      General: Bowel sounds are normal.      Palpations: Abdomen is soft.   Musculoskeletal:      Cervical back: Normal range of motion. Skin:     General: Skin is warm and dry.   Neurological:      General: No focal deficit present.      Mental Status: Alert and oriented to person, place and time.     EKG: Sinus rhythm with nonspecific ST changes in the lateral leads and QS complexes in V1 and V2      Lab Review:   Lab Results   Component Value Date     06/29/2023    K 4.6 06/29/2023     06/29/2023    CO2 28 06/29/2023    BUN 17 06/29/2023    CREATININE 0.88 06/29/2023    GLUCOSE 80 06/29/2023    CALCIUM 9.6  06/29/2023     Lab Results   Component Value Date    WBC 6.0 08/24/2022    HGB 13.8 08/24/2022    HCT 42.6 08/24/2022    MCV 96 08/24/2022     08/24/2022     Lab Results   Component Value Date    CHOL 209 (H) 02/27/2023    TRIG 114 02/27/2023    HDL 68.6 02/27/2023    LDLDIRECT 84 06/04/2021       Assessment/Plan   The encounter diagnosis was Dyspnea on exertion.  No change in patient's symptoms over the past year I have seen the patient.  Still complaining of dyspnea on exertion.  No resting complaints.  Physical examination completely euvolemic.  Hypertension is well-managed.    Dyspnea on exertion: Patient needs a CT/MRI for further evaluation which she is refusing.  Clinically she is euvolemic.  Echocardiogram with low normal EF.  No change in therapy.  Will repeat an echocardiogram after the next visit    Hypertension: Well managed: No change in therapy    Follow-up again in 6 months.

## 2023-12-14 LAB
ATRIAL RATE: 70 BPM
P AXIS: 25 DEGREES
P OFFSET: 187 MS
P ONSET: 140 MS
PR INTERVAL: 156 MS
Q ONSET: 218 MS
QRS COUNT: 12 BEATS
QRS DURATION: 84 MS
QT INTERVAL: 352 MS
QTC CALCULATION(BAZETT): 380 MS
QTC FREDERICIA: 370 MS
R AXIS: 23 DEGREES
T AXIS: 117 DEGREES
T OFFSET: 394 MS
VENTRICULAR RATE: 70 BPM

## 2023-12-26 ENCOUNTER — ANCILLARY PROCEDURE (OUTPATIENT)
Dept: RADIOLOGY | Facility: CLINIC | Age: 78
End: 2023-12-26
Payer: MEDICARE

## 2023-12-26 DIAGNOSIS — M85.852 OTHER SPECIFIED DISORDERS OF BONE DENSITY AND STRUCTURE, LEFT THIGH: ICD-10-CM

## 2023-12-26 DIAGNOSIS — E04.2 NONTOXIC MULTINODULAR GOITER: ICD-10-CM

## 2023-12-26 DIAGNOSIS — R79.89 OTHER SPECIFIED ABNORMAL FINDINGS OF BLOOD CHEMISTRY: ICD-10-CM

## 2023-12-26 PROCEDURE — 77080 DXA BONE DENSITY AXIAL: CPT | Performed by: RADIOLOGY

## 2023-12-26 PROCEDURE — 77080 DXA BONE DENSITY AXIAL: CPT

## 2024-01-24 ENCOUNTER — OFFICE VISIT (OUTPATIENT)
Dept: OPHTHALMOLOGY | Facility: CLINIC | Age: 79
End: 2024-01-24
Payer: MEDICARE

## 2024-01-24 DIAGNOSIS — E11.9 TYPE 2 DIABETES MELLITUS WITHOUT RETINOPATHY (MULTI): Primary | ICD-10-CM

## 2024-01-24 DIAGNOSIS — H25.813 COMBINED FORM OF AGE-RELATED CATARACT, BOTH EYES: ICD-10-CM

## 2024-01-24 DIAGNOSIS — H52.213 IRREGULAR ASTIGMATISM OF BOTH EYES: ICD-10-CM

## 2024-01-24 DIAGNOSIS — H18.513 FUCHS' CORNEAL DYSTROPHY OF BOTH EYES: ICD-10-CM

## 2024-01-24 DIAGNOSIS — H18.603 KERATOCONUS OF BOTH EYES: ICD-10-CM

## 2024-01-24 DIAGNOSIS — H18.513 CORNEAL GUTTATA OF BOTH EYES: ICD-10-CM

## 2024-01-24 PROCEDURE — 92015 DETERMINE REFRACTIVE STATE: CPT | Performed by: STUDENT IN AN ORGANIZED HEALTH CARE EDUCATION/TRAINING PROGRAM

## 2024-01-24 PROCEDURE — 92014 COMPRE OPH EXAM EST PT 1/>: CPT | Performed by: STUDENT IN AN ORGANIZED HEALTH CARE EDUCATION/TRAINING PROGRAM

## 2024-01-24 ASSESSMENT — VISUAL ACUITY
OS_BAT_MED: 20/60
OD_CC+: -1
OS_BAT_HIGH: 20/60
CORRECTION_TYPE: GLASSES
OD_BAT_MED: 20/60
METHOD: SNELLEN - LINEAR
OS_CC: 20/40
OS_BAT_LOW: 20/60
OD_BAT_LOW: 20/50
OD_PH_CC: 20/40
OD_BAT_HIGH: 20/100
OD_PH_CC+: -1
OD_CC: 20/50

## 2024-01-24 ASSESSMENT — CONF VISUAL FIELD
OD_INFERIOR_NASAL_RESTRICTION: 0
OS_INFERIOR_TEMPORAL_RESTRICTION: 0
OS_INFERIOR_NASAL_RESTRICTION: 0
OD_NORMAL: 1
METHOD: COUNTING FINGERS
OS_NORMAL: 1
OD_SUPERIOR_NASAL_RESTRICTION: 0
OD_SUPERIOR_TEMPORAL_RESTRICTION: 0
OS_SUPERIOR_NASAL_RESTRICTION: 0
OD_INFERIOR_TEMPORAL_RESTRICTION: 0
OS_SUPERIOR_TEMPORAL_RESTRICTION: 0

## 2024-01-24 ASSESSMENT — REFRACTION_MANIFEST
OS_AXIS: 162
OD_CYLINDER: -3.00
OD_AXIS: 003
OD_AXIS: 020
OS_CYLINDER: -3.00
OS_ADD: +2.50
OD_CYLINDER: -3.25
OS_CYLINDER: -3.25
OD_SPHERE: -1.25
OD_SPHERE: -1.50
OD_ADD: +2.50
OS_SPHERE: +0.75
METHOD_AUTOREFRACTION: 1
OS_AXIS: 155
OS_SPHERE: PLANO

## 2024-01-24 ASSESSMENT — REFRACTION_WEARINGRX
OD_AXIS: 021
OS_ADD: +2.50
OS_SPHERE: PLANO
OD_ADD: +2.50
OD_CYLINDER: -2.75
OS_CYLINDER: -3.00
OS_AXIS: 149
OD_SPHERE: -0.75

## 2024-01-24 ASSESSMENT — ENCOUNTER SYMPTOMS
CONSTITUTIONAL NEGATIVE: 0
ALLERGIC/IMMUNOLOGIC NEGATIVE: 0
NEUROLOGICAL NEGATIVE: 0
CARDIOVASCULAR NEGATIVE: 0
PSYCHIATRIC NEGATIVE: 0
GASTROINTESTINAL NEGATIVE: 0
HEMATOLOGIC/LYMPHATIC NEGATIVE: 0
ENDOCRINE NEGATIVE: 0
EYES NEGATIVE: 0
MUSCULOSKELETAL NEGATIVE: 0
RESPIRATORY NEGATIVE: 0

## 2024-01-24 ASSESSMENT — TONOMETRY
IOP_METHOD: TONOPEN
OD_IOP_MMHG: 16
OS_IOP_MMHG: 17

## 2024-01-24 ASSESSMENT — CUP TO DISC RATIO
OS_RATIO: .35
OD_RATIO: .35

## 2024-01-24 ASSESSMENT — EXTERNAL EXAM - RIGHT EYE: OD_EXAM: NORMAL

## 2024-01-24 ASSESSMENT — EXTERNAL EXAM - LEFT EYE: OS_EXAM: NORMAL

## 2024-01-24 ASSESSMENT — SLIT LAMP EXAM - LIDS
COMMENTS: GOOD POSITION, MGD UL/LL
COMMENTS: GOOD POSITION, MGD UL/LL

## 2024-01-24 NOTE — PROGRESS NOTES
Assessment/Plan   Diagnoses and all orders for this visit:  Type 2 diabetes mellitus without retinopathy (CMS/HCC)  -No retinopathy observed on exam today od/os, pt ed to continue good BGlc, blood pressure and lipid control, rtc with any changes in vision, otherwise monitor 1 year  Keratoconus of both eyes  Irregular astigmatism of both eyes  -hx of trying contact lenses and having difficulty   -prefers specs at this time  -BCVA with MRX today OD 20/40 OS 20/30  K values today stable to Pentecam 2021:  OD: K1: 44.75 K2: 48.25 OS: K1: 45.00 K2: 49.25  Fuchs' corneal dystrophy of both eyes  Corneal guttata of both eyes  -mild continue to monitor  Combined form of age-related cataract, both eyes  -contributing to reduction in BCVA but unable to assess full potential with concurrent KCN  -will monitor at this time    RTC 6 months for MRX and check on cataracts with DFE

## 2024-02-05 ENCOUNTER — OFFICE VISIT (OUTPATIENT)
Dept: ENDOCRINOLOGY | Facility: CLINIC | Age: 79
End: 2024-02-05
Payer: MEDICARE

## 2024-02-05 VITALS
SYSTOLIC BLOOD PRESSURE: 140 MMHG | HEART RATE: 69 BPM | BODY MASS INDEX: 42.8 KG/M2 | WEIGHT: 234 LBS | DIASTOLIC BLOOD PRESSURE: 94 MMHG

## 2024-02-05 DIAGNOSIS — M85.80 OSTEOPENIA, UNSPECIFIED LOCATION: ICD-10-CM

## 2024-02-05 DIAGNOSIS — R79.89 ABNORMAL THYROID BLOOD TEST: Primary | ICD-10-CM

## 2024-02-05 DIAGNOSIS — R94.6 ABNORMAL THYROID FUNCTION TEST: ICD-10-CM

## 2024-02-05 DIAGNOSIS — E55.9 VITAMIN D DEFICIENCY: ICD-10-CM

## 2024-02-05 DIAGNOSIS — E11.65 TYPE 2 DIABETES MELLITUS WITH HYPERGLYCEMIA, WITHOUT LONG-TERM CURRENT USE OF INSULIN (MULTI): ICD-10-CM

## 2024-02-05 LAB — POC HEMOGLOBIN A1C: 6.8 % (ref 4.2–6.5)

## 2024-02-05 PROCEDURE — 1159F MED LIST DOCD IN RCRD: CPT | Performed by: STUDENT IN AN ORGANIZED HEALTH CARE EDUCATION/TRAINING PROGRAM

## 2024-02-05 PROCEDURE — 1126F AMNT PAIN NOTED NONE PRSNT: CPT | Performed by: STUDENT IN AN ORGANIZED HEALTH CARE EDUCATION/TRAINING PROGRAM

## 2024-02-05 PROCEDURE — 83036 HEMOGLOBIN GLYCOSYLATED A1C: CPT | Performed by: STUDENT IN AN ORGANIZED HEALTH CARE EDUCATION/TRAINING PROGRAM

## 2024-02-05 PROCEDURE — 99214 OFFICE O/P EST MOD 30 MIN: CPT | Performed by: STUDENT IN AN ORGANIZED HEALTH CARE EDUCATION/TRAINING PROGRAM

## 2024-02-05 PROCEDURE — 1160F RVW MEDS BY RX/DR IN RCRD: CPT | Performed by: STUDENT IN AN ORGANIZED HEALTH CARE EDUCATION/TRAINING PROGRAM

## 2024-02-05 PROCEDURE — 3077F SYST BP >= 140 MM HG: CPT | Performed by: STUDENT IN AN ORGANIZED HEALTH CARE EDUCATION/TRAINING PROGRAM

## 2024-02-05 PROCEDURE — 1036F TOBACCO NON-USER: CPT | Performed by: STUDENT IN AN ORGANIZED HEALTH CARE EDUCATION/TRAINING PROGRAM

## 2024-02-05 PROCEDURE — 3080F DIAST BP >= 90 MM HG: CPT | Performed by: STUDENT IN AN ORGANIZED HEALTH CARE EDUCATION/TRAINING PROGRAM

## 2024-02-05 RX ORDER — ACETAMINOPHEN 500 MG
5000 TABLET ORAL DAILY
Qty: 90 TABLET | Refills: 3 | Status: SHIPPED | OUTPATIENT
Start: 2024-02-05

## 2024-02-05 NOTE — PROGRESS NOTES
78F PMH: MNG, s/p fall with right rotator cuff injury, HTN, Obesity, CKD     Following up regarding her thyroid previously seen Dr. Forrest     Known history of MNG dating as far back as 2014 2018 had an FNA of 2 left nodules, benign  2019 1.2cm complex nodule on the right, FNA benign follicular nodule     Last ultrasound was done in 7/2023  Showing stability in nodules compared to 2022   They did mention a left 1A cervical LN    Osteopenia   DXA in 2023 showing osteopenia left femur and spine but FRAX sore not elevated     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    Hyperopia of both eyes with astigmatism and presbyopia    Other conditions influencing health status     Arthritis    Personal history of other diseases of the circulatory system     History of cardiac murmur    Personal history of other endocrine, nutritional and metabolic disease     History of thyroid disease    Pure hypercholesterolemia, unspecified     High cholesterol     Family History   Problem Relation Name Age of Onset    Diabetes Sister      Diabetes Brother      Diabetes Sibling       Social History     Socioeconomic History    Marital status:      Spouse name: Not on file    Number of children: Not on file    Years of education: Not on file    Highest education level: Not on file   Occupational History    Not on file   Tobacco Use    Smoking status: Never     Passive exposure: Past    Smokeless tobacco: Never   Substance and Sexual Activity    Alcohol use: Not Currently    Drug use: Never    Sexual activity: Not on file   Other Topics Concern    Not on file   Social History Narrative    Not on file     Social Determinants of Health     Financial Resource Strain: Not on file   Food Insecurity: Not on file   Transportation Needs: Not on  file   Physical Activity: Not on file   Stress: Not on file   Social Connections: Not on file   Intimate Partner Violence: Not on file   Housing Stability: Not on file     ROS reviewed and is negative except for pertinent findings noted on HPI    Physical Exam  Constitutional:       Appearance: Normal appearance.   Neck:      Comments: Goiter, nodular gland   Abdominal:      Comments: Abdominal obesity   Musculoskeletal:      Comments: Walks with walker   Skin:     General: Skin is warm and dry.   Neurological:      General: No focal deficit present.      Mental Status: She is alert and oriented to person, place, and time.         Muscle spams   labs and imaging reviewed, pertinent findings listed on HPI and Impression    Problem List Items Addressed This Visit       Vitamin D deficiency    Relevant Medications    cholecalciferol (Vitamin D3) 5,000 Units tablet    Other Relevant Orders    Vitamin D 25-Hydroxy,Total (for eval of Vitamin D levels)     Other Visit Diagnoses       Abnormal thyroid blood test    -  Primary    Relevant Orders    Thyroid Stimulating Hormone    Thyroxine, Free    Thyroid Stimulating Immunoglobulin    Thyrotropin Receptor Antibody    Triiodothyronine, Total    Osteopenia, unspecified location        Relevant Medications    cholecalciferol (Vitamin D3) 5,000 Units tablet    Other Relevant Orders    Parathyroid Hormone, Intact    Renal Function Panel    Vitamin D 25-Hydroxy,Total (for eval of Vitamin D levels)    Type 2 diabetes mellitus with hyperglycemia, without long-term current use of insulin (CMS/LTAC, located within [facility])        Relevant Orders    Referral to Nutrition Services    Albumin , Urine Random    Lipid panel    POCT glycosylated hemoglobin (Hb A1C) manually resulted (Completed)    Abnormal thyroid function test        Relevant Orders    Thyroid Stimulating Hormone    Thyroxine, Free        MNG   Was told by previous Endo this needs to be followed up, will have her do repeat ultrasound later this  year.  She had biopsy proven benign thyroid nodule so less suspicious about the cervical LN   -if the repeat ultrasound stays stable, likely space out/stop surveillance     Osteopenia  repeat DEXA in 2025  Vitamin D 5000 units daily       DM2  Most recent A1c showing progression of A1c to diabetes   Not on any meds  We sent for a nutrition referral   If does not improve then will need to start on metformin at next visit  Discussed lifestyle modifications    Follow up in 3-4months

## 2024-02-05 NOTE — PATIENT INSTRUCTIONS
-decrease your vitamin D 5000 units daily   -calcium 500mg daily     -schedule with the nutritionist, if your A1c does not improve we will need to start you on diabetes medications     Do lab, fasting     Follow up 3-4 months     Maryjo Samano MD  Divison of Endocrinology   [facility]   Phone: [phone]    option 4, then option 1  Fax: [phone]

## 2024-02-06 ENCOUNTER — TELEPHONE (OUTPATIENT)
Dept: ENDOCRINOLOGY | Facility: CLINIC | Age: 79
End: 2024-02-06
Payer: MEDICARE

## 2024-02-06 NOTE — TELEPHONE ENCOUNTER
"Patient is asking that the doctor give her scripts for her diabetic supplies.  I tried to find out exactly what she needed (she kept repeating \"strips\" and \"supplies,\"), but it was difficult to understand exactly what she needs.  "

## 2024-02-07 DIAGNOSIS — E11.65 TYPE 2 DIABETES MELLITUS WITH HYPERGLYCEMIA, WITHOUT LONG-TERM CURRENT USE OF INSULIN (MULTI): Primary | ICD-10-CM

## 2024-02-07 RX ORDER — DEXTROSE 4 G
TABLET,CHEWABLE ORAL
Qty: 1 EACH | Refills: 0 | Status: SHIPPED | OUTPATIENT
Start: 2024-02-07

## 2024-02-07 RX ORDER — LANCETS
EACH MISCELLANEOUS
Qty: 100 EACH | Refills: 2 | Status: SHIPPED | OUTPATIENT
Start: 2024-02-07

## 2024-02-07 RX ORDER — IBUPROFEN 200 MG
CAPSULE ORAL
Qty: 100 STRIP | Refills: 5 | Status: SHIPPED | OUTPATIENT
Start: 2024-02-07

## 2024-02-12 ENCOUNTER — LAB (OUTPATIENT)
Dept: LAB | Facility: LAB | Age: 79
End: 2024-02-12
Payer: MEDICARE

## 2024-02-12 DIAGNOSIS — M85.80 OSTEOPENIA, UNSPECIFIED LOCATION: ICD-10-CM

## 2024-02-12 DIAGNOSIS — R79.89 ABNORMAL THYROID BLOOD TEST: ICD-10-CM

## 2024-02-12 DIAGNOSIS — R94.6 ABNORMAL THYROID FUNCTION TEST: ICD-10-CM

## 2024-02-12 DIAGNOSIS — E55.9 VITAMIN D DEFICIENCY: ICD-10-CM

## 2024-02-12 DIAGNOSIS — E11.65 TYPE 2 DIABETES MELLITUS WITH HYPERGLYCEMIA, WITHOUT LONG-TERM CURRENT USE OF INSULIN (MULTI): ICD-10-CM

## 2024-02-12 LAB
25(OH)D3 SERPL-MCNC: 72 NG/ML (ref 30–100)
ALBUMIN SERPL BCP-MCNC: 4.2 G/DL (ref 3.4–5)
ANION GAP SERPL CALC-SCNC: 12 MMOL/L (ref 10–20)
BUN SERPL-MCNC: 15 MG/DL (ref 6–23)
CALCIUM SERPL-MCNC: 9.6 MG/DL (ref 8.6–10.6)
CHLORIDE SERPL-SCNC: 105 MMOL/L (ref 98–107)
CHOLEST SERPL-MCNC: 196 MG/DL (ref 0–199)
CHOLESTEROL/HDL RATIO: 2.9
CO2 SERPL-SCNC: 29 MMOL/L (ref 21–32)
CREAT SERPL-MCNC: 0.89 MG/DL (ref 0.5–1.05)
EGFRCR SERPLBLD CKD-EPI 2021: 67 ML/MIN/1.73M*2
GLUCOSE SERPL-MCNC: 91 MG/DL (ref 74–99)
HDLC SERPL-MCNC: 68 MG/DL
LDLC SERPL CALC-MCNC: 111 MG/DL
NON HDL CHOLESTEROL: 128 MG/DL (ref 0–149)
PHOSPHATE SERPL-MCNC: 3.3 MG/DL (ref 2.5–4.9)
POTASSIUM SERPL-SCNC: 4 MMOL/L (ref 3.5–5.3)
PTH-INTACT SERPL-MCNC: 41.7 PG/ML (ref 18.5–88)
SODIUM SERPL-SCNC: 142 MMOL/L (ref 136–145)
T3 SERPL-MCNC: 137 NG/DL (ref 60–200)
T4 FREE SERPL-MCNC: 0.98 NG/DL (ref 0.78–1.48)
TRIGL SERPL-MCNC: 86 MG/DL (ref 0–149)
TSH SERPL-ACNC: 0.62 MIU/L (ref 0.44–3.98)
VLDL: 17 MG/DL (ref 0–40)

## 2024-02-12 PROCEDURE — 36415 COLL VENOUS BLD VENIPUNCTURE: CPT

## 2024-02-12 PROCEDURE — 84439 ASSAY OF FREE THYROXINE: CPT

## 2024-02-12 PROCEDURE — 84443 ASSAY THYROID STIM HORMONE: CPT

## 2024-02-12 PROCEDURE — 84445 ASSAY OF TSI GLOBULIN: CPT

## 2024-02-12 PROCEDURE — 83519 RIA NONANTIBODY: CPT

## 2024-02-12 PROCEDURE — 84480 ASSAY TRIIODOTHYRONINE (T3): CPT

## 2024-02-12 PROCEDURE — 82306 VITAMIN D 25 HYDROXY: CPT

## 2024-02-12 PROCEDURE — 80061 LIPID PANEL: CPT

## 2024-02-12 PROCEDURE — 80069 RENAL FUNCTION PANEL: CPT

## 2024-02-12 PROCEDURE — 83970 ASSAY OF PARATHORMONE: CPT

## 2024-02-13 LAB — TSH RECEP AB SER-ACNC: <1.1 IU/L

## 2024-02-15 ENCOUNTER — OFFICE VISIT (OUTPATIENT)
Dept: PRIMARY CARE | Facility: CLINIC | Age: 79
End: 2024-02-15
Payer: MEDICARE

## 2024-02-15 VITALS
WEIGHT: 236.7 LBS | OXYGEN SATURATION: 95 % | TEMPERATURE: 98 F | HEIGHT: 62 IN | BODY MASS INDEX: 43.56 KG/M2 | HEART RATE: 84 BPM | DIASTOLIC BLOOD PRESSURE: 85 MMHG | SYSTOLIC BLOOD PRESSURE: 133 MMHG

## 2024-02-15 DIAGNOSIS — E11.618 DIABETIC FROZEN SHOULDER ASSOCIATED WITH TYPE 2 DIABETES MELLITUS (MULTI): Primary | ICD-10-CM

## 2024-02-15 DIAGNOSIS — M75.00 DIABETIC FROZEN SHOULDER ASSOCIATED WITH TYPE 2 DIABETES MELLITUS (MULTI): Primary | ICD-10-CM

## 2024-02-15 DIAGNOSIS — E11.69 TYPE 2 DIABETES MELLITUS WITH OTHER SPECIFIED COMPLICATION, WITHOUT LONG-TERM CURRENT USE OF INSULIN (MULTI): ICD-10-CM

## 2024-02-15 PROCEDURE — 99213 OFFICE O/P EST LOW 20 MIN: CPT | Performed by: FAMILY MEDICINE

## 2024-02-15 PROCEDURE — 1036F TOBACCO NON-USER: CPT | Performed by: FAMILY MEDICINE

## 2024-02-15 PROCEDURE — 3075F SYST BP GE 130 - 139MM HG: CPT | Performed by: FAMILY MEDICINE

## 2024-02-15 PROCEDURE — 1126F AMNT PAIN NOTED NONE PRSNT: CPT | Performed by: FAMILY MEDICINE

## 2024-02-15 PROCEDURE — 3079F DIAST BP 80-89 MM HG: CPT | Performed by: FAMILY MEDICINE

## 2024-02-15 PROCEDURE — 1159F MED LIST DOCD IN RCRD: CPT | Performed by: FAMILY MEDICINE

## 2024-02-15 PROCEDURE — 1160F RVW MEDS BY RX/DR IN RCRD: CPT | Performed by: FAMILY MEDICINE

## 2024-02-15 ASSESSMENT — PAIN SCALES - GENERAL: PAINLEVEL: 0-NO PAIN

## 2024-02-15 NOTE — PROGRESS NOTES
"Subjective   Patient ID: Celeste Murray is a 78 y.o. female who presents for chronic shoulder pain. PMH sig for multinodular goiter in thyroid, s/p fall with right rotator cuff injury, HTN, obesity, CKD, pre-diabetes.     HPI:  Chronic shoulder pain    Pt slipped on ice and fell 1/27/23. Has had chronic bilateral shoulder, neck, and back pain since. Saw orthopedic surgery at the time and did 18 sessions of physical therapy. Still endorsing significant pain and interested in going back to physical therapy. Has difficulty with most day-to-day activities 2/2 pain including walking and sleeping. Is able to drive. Endorses that tylenol, ibuprofen, and flexeril all help with pain.     Pt lives alone and uses walker. Bedroom, kitchen, bathroom are all on one floor. Pt feels safe at home and has not had any falls.    Pt follows with Dr. Becerra as PCP (last apt was 5/5/23), with Dr. Cuevas in Cardiology (last 12/7/23), with Dr. Samano in endocrinology (2/5/24), with Dr. Hernandez in ophthalmology (1/24/24). Cardiology is managing HTN medications. Endocrinology has been managing thyroid disease and chronic pre-diabetes. Pt's most recent A1c was 6.8. Endocrinology referred to nutrition for initial lifestyle interventions. Planning to beginning medication if no improvement in A1c.    Review of Systems  Negative except where noted in HPI.    Objective   /85   Pulse 84   Temp 36.7 °C (98 °F) (Tympanic)   Ht 1.575 m (5' 2\")   Wt 107 kg (236 lb 11.2 oz)   SpO2 95%   BMI 43.29 kg/m²     Physical Exam  HENT:      Head: Normocephalic and atraumatic.   Neck:      Comments: Bilateral cervical paravertebral tenderness to palpation extending into shoulders and down arms. Neck ROM in all directions limited by pain. No vertebral point tenderness or step offs noted.  Cardiovascular:      Rate and Rhythm: Normal rate and regular rhythm.      Pulses: Normal pulses.   Pulmonary:      Effort: Pulmonary effort is normal. No respiratory " distress.      Breath sounds: Normal breath sounds. No stridor. No wheezing.   Abdominal:      General: There is no distension.      Tenderness: There is no abdominal tenderness. There is no guarding.   Musculoskeletal:      Comments: Bilateral shoulder ROM limited in all directions by pain. Apprehension noted throughout exam. Tenderness to palpation diffusely on bilateral shoulders extending down arms to wrists.   Neurological:      Mental Status: She is alert. Mental status is at baseline.   Psychiatric:         Mood and Affect: Mood normal.         Behavior: Behavior normal.      Comments: Mildly tangential historian.       Assessment/Plan   Celeste Murray is a 78 y.o. female presenting for chronic shoulder pain. PMH sig for multinodular goiter in thyroid, s/p fall with right rotator cuff injury, HTN, obesity, CKD, pre-diabetes.    Chronic shoulder pain  Dx includes adhesive capsulitis, rotator cuff dysfunction; exclude superimposed PMR  -Agree with referral to physical therapy,  Renal function is adequate for NSAID but patient encouraged to use acetaminophen when able because of potential interaction of NSAID with the daily aspirin.   Plan: Physical therapy referral made  ESR, renal function panel    Hypertension:  Compliant with losartan hydrochlorothiazide 50/12.5.  Slightly above goal and if greater than 130/80 at next visit, consider increasing Hyzaar to 100/25.     Patient will return for Medicare annual wellness visit with Dr. Becerra 1-3 months.

## 2024-02-16 LAB — TSI SER-ACNC: <1 TSI INDEX

## 2024-02-23 DIAGNOSIS — S46.001S ROTATOR CUFF INJURY, RIGHT, SEQUELA: Primary | ICD-10-CM

## 2024-02-23 DIAGNOSIS — M75.00 DIABETIC FROZEN SHOULDER ASSOCIATED WITH TYPE 2 DIABETES MELLITUS (MULTI): ICD-10-CM

## 2024-02-23 DIAGNOSIS — E11.618 DIABETIC FROZEN SHOULDER ASSOCIATED WITH TYPE 2 DIABETES MELLITUS (MULTI): ICD-10-CM

## 2024-02-23 NOTE — PROGRESS NOTES
I changed physical therapy referral to reflect rotator cuff injury.  Patient will seek therapy at [facility].

## 2024-03-20 ENCOUNTER — EVALUATION (OUTPATIENT)
Dept: PHYSICAL THERAPY | Facility: CLINIC | Age: 79
End: 2024-03-20
Payer: MEDICARE

## 2024-03-20 DIAGNOSIS — M25.512 CHRONIC PAIN OF BOTH SHOULDERS: Primary | ICD-10-CM

## 2024-03-20 DIAGNOSIS — S46.001S ROTATOR CUFF INJURY, RIGHT, SEQUELA: ICD-10-CM

## 2024-03-20 DIAGNOSIS — M25.511 CHRONIC PAIN OF BOTH SHOULDERS: Primary | ICD-10-CM

## 2024-03-20 DIAGNOSIS — G89.29 CHRONIC PAIN OF BOTH SHOULDERS: Primary | ICD-10-CM

## 2024-03-20 PROCEDURE — 97162 PT EVAL MOD COMPLEX 30 MIN: CPT | Mod: GP | Performed by: PHYSICAL THERAPIST

## 2024-03-20 PROCEDURE — 97110 THERAPEUTIC EXERCISES: CPT | Mod: GP | Performed by: PHYSICAL THERAPIST

## 2024-03-20 ASSESSMENT — ENCOUNTER SYMPTOMS
OCCASIONAL FEELINGS OF UNSTEADINESS: 1
DEPRESSION: 1
LOSS OF SENSATION IN FEET: 0

## 2024-03-21 NOTE — PROGRESS NOTES
"Physical Therapy    Physical Therapy Evaluation and Treatment      Patient Name: Celeste Murray  MRN: [id number]  Today's Date: 3/21/2024  Time Calculation:   Start Time: 1220  Stop Time: 1300  Total Time: 45 mins  PT Evaluation Time Entry  PT Evaluation (Complex) Time Entry: 30  PT Therapeutic Procedures Time Entry  Therapeutic Exercise Time Entry: 10     Visit #1    Assessment:  Celeste is a 79 y.o. R hand dominant female presenting with chronic R>L shoulder and neck pain since a fall on 1/27/23. Exam shows global TTP over neck, thoracic spine and shoulders, shoulder mobility deficits, decreased upper extremity strength and muscle performance and postural impairments. Spurling's test did not reproduce n/t in R hand and rotator cuff cluster showed 1/3 positive tests. She is limited in functional use of arm for most ADLs including reaching, dressing and lifting. She is a good candidate for skilled PT to address these impairments and reduce pain to improve ADLs.    Plan:  OP PT Plan  Treatment/Interventions: Cryotherapy, Education/ Instruction, Hot pack, Manual therapy, Therapeutic exercises, Neuromuscular re-education  PT Plan: Skilled PT  PT Frequency:  (1-2x/week)  Duration: 8-12 weeks  Onset Date: 01/27/23  Certification Period Start Date: 03/20/24  Certification Period End Date: 06/18/24  Number of Treatments Authorized: Needs auth  Rehab Potential: Good  Plan of Care Agreement: Patient    Current Problem:   1. Chronic pain of both shoulders        2. Rotator cuff injury, right, sequela  Referral to Physical Therapy        General:  Reason for Referral: Bilateral shoulder pain  Referred By: Dr. Brandee Boucher  Preferred Learning Style: verbal, visual, written    Subjective    Celeste c/o chronic B shoulder and neck pain R>L ongoing since she fell on 1/27/23 when she slipped on ice and fell flat on her back. She describes pain as \"sharp spasms\" constant in nature and worse with quick movements. She also reports some " "numbness in R hand. She has been taking Flexeril, Tylenol and ibuprofen and using salonpas patches and a heating pad. She has difficulty using her arm for most ADLs including reaching and dressing and has occasional difficulty sleeping. She also stopped exercising at the [facility]. \"I can't be as active as I was before.\" PMHx: diabetes, HTN, OA, thyroid disorder    Pt Goals: “My right side is my main side and I'd like to be able to use my right side again.”    Precautions:  Precautions  STEADI Fall Risk Score (The score of 4 or more indicates an increased risk of falling): 8    Pain:  8/10 pain currently “spasms, sharp” constant worse with quick movements    Prior Level of Function:  Independent prior to fall.    Objective   Observation: presents amb with rollator; postural assessment shows forward head, rounded shoulders, scapular protraction    Palpation: global TTP to neck, thoracic spine and shoulders    Neck AROM (degrees):   Flexion- 40  Extension- 60  Lateral flexion- R- 25; L- 30  Rotation- R- 60; L- 45    Shoulder AROM (R/L in degrees):   Flexion- 70/ 145  Abduction- 60/ 80  Functional ER reach- occiput/ T2  Functional IR reach- PSIS/ T10    Dermatomes: pt reports diminished sensation throughout RUE when compared to LUE    Myotomes: full and symmetrical    Shoulder MMT (R/L):   Flexion- 4+/5; 5/5  Abduction- 4-/5; 4+/5  ER- 4-/5; 4/5  IR- 4+/5; 4+/5  Biceps- 4+/5; 4+/5  Triceps- 4+/5; 4+/5    Special tests:    (-) Spurling's    Rotator cuff tear cluster:  (-) Drop arm   (-) Painful arc  (+) Infraspinatus MMT    Outcome Measures:  Quick DASH: 44 = 75.00%    Treatments:  Seated chin tucks x10  Seated scap retractions x10  Seated towel flexion x10    EDUCATION:   Issued/reviewed HEP to be performed 2x/day    Goals:  Active       PT Problem       Increase neck AROM to 30-35 degrees lateral flexion and L rotation to 60 degrees to improve neck mobility with ADLs.       Start:  03/21/24    Expected End:  06/03/24       "      Increase R shoulder AROM to at least 130 degrees- flexion, functional ER reach to T2 and functional IR reach to at least L1 and B abduction to at least 120 degrees to improve reaching and dressing.       Start:  03/21/24    Expected End:  06/03/24            Increase B shoulder MMT by at least 1/3 grade to improve reaching and lifting.       Start:  03/21/24    Expected End:  06/03/24            Pt will report at least 75% decrease in pain to improve functional use of arm for ADLs and sleeping.       Start:  03/21/24    Expected End:  06/03/24            Improve Quick DASH by at least 15.91 points (MDIC).       Start:  03/21/24    Expected End:  06/03/24

## 2024-03-29 ENCOUNTER — TELEMEDICINE CLINICAL SUPPORT (OUTPATIENT)
Dept: ENDOCRINOLOGY | Facility: CLINIC | Age: 79
End: 2024-03-29
Payer: MEDICARE

## 2024-03-29 VITALS — BODY MASS INDEX: 43.29 KG/M2 | HEIGHT: 62 IN

## 2024-03-29 DIAGNOSIS — E11.65 TYPE 2 DIABETES MELLITUS WITH HYPERGLYCEMIA, WITHOUT LONG-TERM CURRENT USE OF INSULIN (MULTI): ICD-10-CM

## 2024-03-29 PROCEDURE — 97802 MEDICAL NUTRITION INDIV IN: CPT

## 2024-03-29 NOTE — PROGRESS NOTES
Nutrition: Initial Assessment    Reason for Nutrition Visit: Patient is a 79 y.o. female referred for T2DM. Referred on 2/5/24 by Dr. Samano.     A telephone visit (audio only) between the patient (at the originating site) and the provider (at the distant site) was utilized to provide this telehealth service.   Verbal consent was requested and obtained from Celeste Murray on this date, 03/29/24 for a telehealth visit.     Nutrition Assessment    Food & Nutrition Related History: Trying to eat more nuts. Has been making smoothies with bananas and berries. Using leaner meats such as ground turkey.     Dietary Considerations: None  Allergies: None  Intolerance: None  Appetite: Normal  Intake: >75%  GI Symptoms : None  Swallowing Difficulty: No problems with swallowing  Dentition : lower denture/partial and upper denture/partial  Food Preparation: Patient  Cooking Skills/Barriers: None reported  Grocery Shopping: Patient  Supplements: Vitamin D3 50,000 UT once weekly, vitamin C, MVI, zinc daily    Food Insecurity: Denies - Utilizes food bank 2x per month    Dietary Recall:   Meal 1: PB with crackers or a smoothie, cup of coffee; drinking OJ  Meal 2: Goes to [employer] and eats there 2x per week, often meals are served w/ vegetables such as mixed veg or broccoli // PB with crackers and smoothie   Meal 3: 3 pieces fried fish, 1 slice bread    Snacks:  chips, bananas, nuts, snickers candy bars   Beverages: water, coffee w/ sugar and cream, Dr. Pepper (0-3 cans per day), cranberry juice   Eating out: rarely, has cut back   Alcohol Intake: occ wine or beer    Physical Activity: Trying to do more movement around the house. Will be starting physical therapy.     Labs:  Lab Results   Component Value Date    HGBA1C 6.8 (A) 02/05/2024    HGBA1C 6.1 (A) 06/12/2023    HGBA1C 6.1 (A) 12/07/2022     02/12/2024    K 4.0 02/12/2024     02/12/2024    CO2 29 02/12/2024    BUN 15 02/12/2024    CREATININE 0.89 02/12/2024     CALCIUM 9.6 02/12/2024    ALBUMIN 4.2 02/12/2024    PROT 7.1 06/12/2023    BILITOT 0.5 06/12/2023    ALKPHOS 90 06/12/2023    ALT 18 06/12/2023    AST 20 06/12/2023    GLUCOSE 91 02/12/2024    CHOL 196 02/12/2024    TRIG 86 02/12/2024    HDL 68.0 02/12/2024   Comments: A1c = 6.8%, trending upward (2/5/24). Marginally elevated LDL =111 (2/12/24).     Diabetes:  Diagnosed 2 months ago   Prior Nutrition Education: Yes  SMBG: meter, does not check at home  Hypoglycemia: unknown   Current DM Medications/Insulin Regimen: none    Reports BP yesterday was 130/86    Nutrition Focused Physical Exam:  Performed/Deferred: Deferred due to be being virtual visit      Past Medical History:  Patient Active Problem List   Diagnosis    Abnormal bowel habits    Abnormal lung field    Abnormal lung scan    Abnormal MRI of head    Abnormal stool caliber    Abnormal urinalysis    Abnormal urine    Abscess    Arthritis of hip    Back pain, lumbosacral    Bowel habit changes    Cervical adenopathy    Chest x-ray abnormality    Chronic bilateral low back pain without sciatica    Chronic bilateral thoracic back pain    Chronic musculoskeletal pain    Chronic nonintractable headache    Chronic rhinitis    Combined form of age-related cataract, both eyes    Constipation    Cornea guttata    Class 3 severe obesity without serious comorbidity with body mass index (BMI) of 40.0 to 44.9 in adult (CMS/HCC)    Diastolic dysfunction, left ventricle    Dysphagia    Dyspnea on exertion    Esophageal reflux    Excessive daytime sleepiness    Exposure to COVID-19 virus    Facial pressure    Fall    Fuchs' endothelial dystrophy    Gait abnormality    History of diabetes mellitus, type II    History of adenomatous polyp of colon    History of herpes genitalis    Hyperglycemia    Hypertension with goal blood pressure less than 130/80    Hypoglycemia    Intractable back pain    Incomplete passage of stool    Irregular astigmatism of both eyes     "Incontinence of feces    Keratoconus of both eyes    Low vitamin D level    Lower extremity pain    Lumbar strain    Multinodular goiter    Nausea in adult    Neck mass    Observed sleep apnea    Glossitis    Osteopenia of left forearm    Osteopenia of neck of left femur    Pain of upper abdomen    Osteoarthritis of right knee    Palpitations    Postnasal drip    Pre-diabetes    Rectal pressure    Rheumatoid arthritis (CMS/HCC)    Right-sided thoracic back pain    Right knee pain    Periodic limb movement    Shortness of breath    Sleep disorder breathing    Snoring    Taking multiple medications for chronic disease    Thyroid disorder    Thyroid nodule    Tubular adenoma of colon    Uncontrolled hypertension    Varicose vein of leg    Morbid obesity (CMS/HCC)    Stage 1 chronic kidney disease    Anemia of chronic disorder    Arthritis of left shoulder region    Breast lump    Diverticular disease of colon    Genital herpes simplex    Herniated lumbar intervertebral disc    Hypercholesterolemia    Internal hemorrhoids    Kidney problem    Neuropathy    Urinary frequency    Uterine leiomyoma    Vitamin D deficiency    Type 2 diabetes mellitus without retinopathy (CMS/HCC)    Chronic pain of both shoulders        Anthropometrics:  Ht Readings from Last 1 Encounters:   03/29/24 1.575 m (5' 2\")     BMI Readings from Last 1 Encounters:   03/29/24 43.29 kg/m²     Wt Readings from Last 10 Encounters:   02/15/24 107 kg (236 lb 11.2 oz)   02/05/24 106 kg (234 lb)   12/07/23 105 kg (232 lb 2 oz)   06/12/23 103 kg (227 lb)   06/08/23 103 kg (228 lb)   05/05/23 101 kg (223 lb)   04/03/23 100 kg (221 lb)   03/02/23 97.1 kg (214 lb)   02/27/23 97.7 kg (215 lb 6 oz)   12/30/22 99.4 kg (219 lb 0.6 oz)     Weight change: Wt gain of 10% (22 lbs) x 1 year  Clinically significant weight change: No    Estimated Nutrition Needs:    Total Energy Estimated Needs (kCal): 1300 kCal   Method for Estimating Needs: MSJ 1509 x 1.2 - 500 (for " weight loss)   Total Protein Estimated Needs (g): 85 g   Total Protein Estimated Needs (g/kg): 0.8 g/kg    Nutrition Diagnosis     Patient has Malnutrition Diagnosis: No          Patient has Nutrition Diagnosis: Yes Diagnosis Status (1): New  Nutrition Diagnosis 1: Food and nutrition related knowledge deficit Related to (1): newly diagnosed T2DM As Evidenced by (1): patient demonstrates incomplete knowledge       Nutrition Interventions/Recommendations   FOOD & NUTRIENT DELIVERY: Consistent Carbohydrate Diet, Decreased Carbohydrate Diet, and Increased Fiber Diet    NUTRITION COUNSELING: Goal Setting    NUTRITION EDUCATION:   1) Discussed blood sugar still elevates when eating natural sugar from fruit juices and fruits. Talked about how it is okay to have some fruit or very occasional juice but we need to limit natural sugars as well as added sugar to keep blood sugar managed. Discussed switching from orange juice in the morning to eating a whole orange.   2) Talked about importance of limiting sugary beverages such as pop and juice. Set goals for her to limit.   3) Discussed switching to SF creamer in her coffee.   4) Encouraged more protein, especially at breakfast, instead of only drinking the fruit smoothie. Discussed that she will plan to have more eggs for breakfast. She will limit fruit smoothie to 6-8 ounces.     Educational Handouts: ADA Plate Method    *Patient expressed understanding of the education provided and denied any additional questions/concerns.       Nutrition Monitoring and Evaluation   Intentional weight loss of 0.5-2 lb per week, trending toward a clinically significant weight loss of 5-10% of current body weight.  Reduced A1c  Reduced LDL       Patient's Goals:   Patient will limit sugary beverages to 3 cans per week.   Switch to a sugar free creamer for your coffee.   Go back to eating eggs for breakfast.     Readiness to Change : Good  Level of Understanding : Good  Anticipated Compliant :  Good     Follow-up: 4/23/24 @ 10:30 AM in office

## 2024-04-11 ENCOUNTER — TREATMENT (OUTPATIENT)
Dept: PHYSICAL THERAPY | Facility: CLINIC | Age: 79
End: 2024-04-11
Payer: MEDICARE

## 2024-04-11 DIAGNOSIS — M25.512 CHRONIC PAIN OF BOTH SHOULDERS: Primary | ICD-10-CM

## 2024-04-11 DIAGNOSIS — M25.511 CHRONIC PAIN OF BOTH SHOULDERS: Primary | ICD-10-CM

## 2024-04-11 DIAGNOSIS — S46.001S ROTATOR CUFF INJURY, RIGHT, SEQUELA: ICD-10-CM

## 2024-04-11 DIAGNOSIS — G89.29 CHRONIC PAIN OF BOTH SHOULDERS: Primary | ICD-10-CM

## 2024-04-11 PROCEDURE — 97110 THERAPEUTIC EXERCISES: CPT | Mod: GP,CQ

## 2024-04-11 NOTE — PROGRESS NOTES
"Physical Therapy    Physical Therapy Evaluation and Treatment      Patient Name: Celeste Murray  MRN: [id number]  Today's Date: 4/11/2024  Time Calculation:   Start Time: 1220  Stop Time: 1300  Total Time: 45 mins     PT Therapeutic Procedures Time Entry  Therapeutic Exercise Time Entry: 35     Visit #2    Assessment:  Favorable response to heat, decreased pain reported EOS, 3/10.  Pt did not tolerate any STM attempts    Plan:  Supine wand as able      1` DX M25.511  Current Problem:   1. Chronic pain of both shoulders        2. Rotator cuff injury, right, sequela            Subjective    \"L shoulder is better, R still bad and the neck is bad.\"    7/10 pain L shoulder, neck      Objective   AROM rotation of C-spine 50` R, 30` L initially    Treatments:  HP to C-spine x 10 min  Seated chin tucks x10  Seated scap retractions x10  Seated towel shoulder flexion x10  UBE 1/1  Neer Press up, supine x 5  Attempted gentle STM to bilat UT's/R shoulder, pt did not tolerate Gr.1  Seated c-spine rotation x 5 ea  Seated c-spine flex/ext x 5 ea    EDUCATION:   Issued/reviewed HEP to be performed 2x/day    Goals:  Active       PT Problem       Increase neck AROM to 30-35 degrees lateral flexion and L rotation to 60 degrees to improve neck mobility with ADLs.       Start:  03/21/24    Expected End:  06/03/24            Increase R shoulder AROM to at least 130 degrees- flexion, functional ER reach to T2 and functional IR reach to at least L1 and B abduction to at least 120 degrees to improve reaching and dressing.       Start:  03/21/24    Expected End:  06/03/24            Increase B shoulder MMT by at least 1/3 grade to improve reaching and lifting.       Start:  03/21/24    Expected End:  06/03/24            Pt will report at least 75% decrease in pain to improve functional use of arm for ADLs and sleeping.       Start:  03/21/24    Expected End:  06/03/24            Improve Quick DASH by at least 15.91 points (MDIC).       Start:  " 03/21/24    Expected End:  06/03/24

## 2024-04-18 ENCOUNTER — TREATMENT (OUTPATIENT)
Dept: PHYSICAL THERAPY | Facility: CLINIC | Age: 79
End: 2024-04-18
Payer: MEDICARE

## 2024-04-18 DIAGNOSIS — M25.512 CHRONIC PAIN OF BOTH SHOULDERS: Primary | ICD-10-CM

## 2024-04-18 DIAGNOSIS — M25.511 CHRONIC PAIN OF BOTH SHOULDERS: Primary | ICD-10-CM

## 2024-04-18 DIAGNOSIS — G89.29 CHRONIC PAIN OF BOTH SHOULDERS: Primary | ICD-10-CM

## 2024-04-18 DIAGNOSIS — S46.001S ROTATOR CUFF INJURY, RIGHT, SEQUELA: ICD-10-CM

## 2024-04-18 PROCEDURE — 97110 THERAPEUTIC EXERCISES: CPT | Mod: GP,CQ

## 2024-04-18 NOTE — PROGRESS NOTES
"  Physical Therapy    Physical Therapy Evaluation and Treatment      Patient Name: Celeste Murray  MRN: [id number]  Today's Date: 4/18/2024     PT Therapeutic Procedures Time Entry  Therapeutic Exercise Time Entry: 40     Visit #3    Assessment:  Pt was able to progress c-spine portion of rx, shoulder continues to spasm w/ exertion    Plan:  Seated press, flex as able      1` DX M25.511  Current Problem:   1. Chronic pain of both shoulders        2. Rotator cuff injury, right, sequela            Subjective    \"Everything is feeling better.\"    5/10 pain L shoulder, neck      Objective   AROM rotation of C-spine 60` R, 40` L initially    Treatments:  HP to C-spine x 10 min  Seated chin tucks x12  Seated scap retractions x10  Seated towel shoulder flexion x15  C-spine  flex/ext w/ towel x 10 ea  C-spine rotation w/ towel x 5 ea  UBE 2/2  Neer Press up, supine x Attempted  Seated press attempted    EDUCATION:   Issued/reviewed HEP to be performed 2x/day    Goals:  Active       PT Problem       Increase neck AROM to 30-35 degrees lateral flexion and L rotation to 60 degrees to improve neck mobility with ADLs.       Start:  03/21/24    Expected End:  06/03/24            Increase R shoulder AROM to at least 130 degrees- flexion, functional ER reach to T2 and functional IR reach to at least L1 and B abduction to at least 120 degrees to improve reaching and dressing.       Start:  03/21/24    Expected End:  06/03/24            Increase B shoulder MMT by at least 1/3 grade to improve reaching and lifting.       Start:  03/21/24    Expected End:  06/03/24            Pt will report at least 75% decrease in pain to improve functional use of arm for ADLs and sleeping.       Start:  03/21/24    Expected End:  06/03/24            Improve Quick DASH by at least 15.91 points (MDIC).       Start:  03/21/24    Expected End:  06/03/24              "

## 2024-04-22 ENCOUNTER — APPOINTMENT (OUTPATIENT)
Dept: PHYSICAL THERAPY | Facility: CLINIC | Age: 79
End: 2024-04-22
Payer: MEDICARE

## 2024-04-23 ENCOUNTER — NUTRITION (OUTPATIENT)
Dept: PRIMARY CARE | Facility: CLINIC | Age: 79
End: 2024-04-23
Payer: MEDICARE

## 2024-04-23 DIAGNOSIS — E11.9 TYPE 2 DIABETES MELLITUS WITHOUT COMPLICATION, WITHOUT LONG-TERM CURRENT USE OF INSULIN (MULTI): Primary | ICD-10-CM

## 2024-04-23 PROCEDURE — 97803 MED NUTRITION INDIV SUBSEQ: CPT

## 2024-04-23 NOTE — PROGRESS NOTES
Nutrition: Follow-up     Reason for Nutrition Visit: Patient is a 79 y.o. female referred for T2DM. Referred on 2/5/24 by Dr. Samano.     Nutrition Assessment    Food & Nutrition Related History: Pt presents in office for visit #2.     Dietary Considerations: None  Allergies: None  Intolerance: None  Appetite: Normal  Intake: >75%  GI Symptoms : None  Swallowing Difficulty: No problems with swallowing  Dentition : lower denture/partial and upper denture/partial  Food Preparation: Patient  Cooking Skills/Barriers: None reported  Grocery Shopping: Patient  Supplements: Vitamin D3 50,000 UT once weekly, vitamin C, MVI, zinc daily    Food Insecurity: Denies - Utilizes food bank 2x per month    Dietary Recall:   Meal 1: PB with crackers or a smoothie, cup of coffee; drinking OJ  Meal 2: Goes to [facility] and eats there 2x per week, often meals are served w/ vegetables such as mixed veg or broccoli // PB with crackers and smoothie   Meal 3: 3 pieces fried fish, 1 slice bread    Snacks:  chips, bananas, nuts, snickers candy bars   Beverages: water, coffee w/ sugar and cream, DrLivier Pepper (0-3 cans per day), cranberry juice   Eating out: rarely, has cut back   Alcohol Intake: occ wine or beer    Physical Activity: Trying to do more movement around the house. Will be starting physical therapy.     Labs:  Lab Results   Component Value Date    HGBA1C 6.8 (A) 02/05/2024    HGBA1C 6.1 (A) 06/12/2023    HGBA1C 6.1 (A) 12/07/2022     02/12/2024    K 4.0 02/12/2024     02/12/2024    CO2 29 02/12/2024    BUN 15 02/12/2024    CREATININE 0.89 02/12/2024    CALCIUM 9.6 02/12/2024    ALBUMIN 4.2 02/12/2024    PROT 7.1 06/12/2023    BILITOT 0.5 06/12/2023    ALKPHOS 90 06/12/2023    ALT 18 06/12/2023    AST 20 06/12/2023    GLUCOSE 91 02/12/2024    CHOL 196 02/12/2024    TRIG 86 02/12/2024    HDL 68.0 02/12/2024   Comments: No updated labs since last visit. A1c = 6.8%, trending upward (2/5/24). Marginally elevated LDL  =111 (2/12/24).     Diabetes:  Diagnosed 2 months ago   Prior Nutrition Education: Yes  SMBG: meter, does not check at home  Hypoglycemia: unknown   Current DM Medications/Insulin Regimen: none    Reports BP yesterday was 130/86    Nutrition Focused Physical Exam:  Performed/Deferred: Deferred due to be being virtual visit      Past Medical History:  Patient Active Problem List   Diagnosis    Abnormal bowel habits    Abnormal lung field    Abnormal lung scan    Abnormal MRI of head    Abnormal stool caliber    Abnormal urinalysis    Abnormal urine    Abscess    Arthritis of hip    Back pain, lumbosacral    Bowel habit changes    Cervical adenopathy    Chest x-ray abnormality    Chronic bilateral low back pain without sciatica    Chronic bilateral thoracic back pain    Chronic musculoskeletal pain    Chronic nonintractable headache    Chronic rhinitis    Combined form of age-related cataract, both eyes    Constipation    Cornea guttata    Class 3 severe obesity without serious comorbidity with body mass index (BMI) of 40.0 to 44.9 in adult (CMS/HCC)    Diastolic dysfunction, left ventricle    Dysphagia    Dyspnea on exertion    Esophageal reflux    Excessive daytime sleepiness    Exposure to COVID-19 virus    Facial pressure    Fall    Fuchs' endothelial dystrophy    Gait abnormality    History of diabetes mellitus, type II    History of adenomatous polyp of colon    History of herpes genitalis    Hyperglycemia    Hypertension with goal blood pressure less than 130/80    Hypoglycemia    Intractable back pain    Incomplete passage of stool    Irregular astigmatism of both eyes    Incontinence of feces    Keratoconus of both eyes    Low vitamin D level    Lower extremity pain    Lumbar strain    Multinodular goiter    Nausea in adult    Neck mass    Observed sleep apnea    Glossitis    Osteopenia of left forearm    Osteopenia of neck of left femur    Pain of upper abdomen    Osteoarthritis of right knee    Palpitations  "   Postnasal drip    Pre-diabetes    Rectal pressure    Rheumatoid arthritis (CMS/HCC)    Right-sided thoracic back pain    Right knee pain    Periodic limb movement    Shortness of breath    Sleep disorder breathing    Snoring    Taking multiple medications for chronic disease    Thyroid disorder    Thyroid nodule    Tubular adenoma of colon    Uncontrolled hypertension    Varicose vein of leg    Morbid obesity (CMS/HCC)    Stage 1 chronic kidney disease    Anemia of chronic disorder    Arthritis of left shoulder region    Breast lump    Diverticular disease of colon    Genital herpes simplex    Herniated lumbar intervertebral disc    Hypercholesterolemia    Internal hemorrhoids    Kidney problem    Neuropathy    Urinary frequency    Uterine leiomyoma    Vitamin D deficiency    Type 2 diabetes mellitus without retinopathy (CMS/HCC)    Chronic pain of both shoulders        Anthropometrics:  Ht Readings from Last 1 Encounters:   03/29/24 1.575 m (5' 2\")     BMI Readings from Last 1 Encounters:   03/29/24 43.29 kg/m²     Wt Readings from Last 10 Encounters:   02/15/24 107 kg (236 lb 11.2 oz)   02/05/24 106 kg (234 lb)   12/07/23 105 kg (232 lb 2 oz)   06/12/23 103 kg (227 lb)   06/08/23 103 kg (228 lb)   05/05/23 101 kg (223 lb)   04/03/23 100 kg (221 lb)   03/02/23 97.1 kg (214 lb)   02/27/23 97.7 kg (215 lb 6 oz)   12/30/22 99.4 kg (219 lb 0.6 oz)     Weight change: Wt gain of 10% (22 lbs) x 1 year  Clinically significant weight change: No    Estimated Nutrition Needs:    Total Energy Estimated Needs (kCal): 1300 kCal   Method for Estimating Needs: MSJ 1509 x 1.2 - 500 (for weight loss)   Total Protein Estimated Needs (g): 85 g   Total Protein Estimated Needs (g/kg): 0.8 g/kg    Nutrition Diagnosis     Patient has Malnutrition Diagnosis: No          Patient has Nutrition Diagnosis: Yes Diagnosis Status (1): ongoing   Nutrition Diagnosis 1: Food and nutrition related knowledge deficit Related to (1): newly diagnosed " T2DM As Evidenced by (1): patient demonstrates incomplete knowledge       Nutrition Interventions/Recommendations   FOOD & NUTRIENT DELIVERY: Consistent Carbohydrate Diet, Decreased Carbohydrate Diet, and Increased Fiber Diet    NUTRITION COUNSELING: Goal Setting    NUTRITION EDUCATION:   Provided education on what happens in the body when prediabetes and diabetes develop. Explained why providing consistent amounts of carbohydrates in the diet is helpful for regulating blood sugar. Encouraged choosing foods that contain minimally processed complex carbohydrates, such as high fiber whole grains, beans, starchy vegetables, whole fruits. Simple sugars from fruit juice, sugar-sweetened beverages, and foods with added sugar should be limited in the diet. Also discussed how foods like protein, some fat, and non-starchy vegetables impact blood sugar regulation.  Provided education on Plate Method as a tool for preparing balanced meals. Discussed the following: Fill 1/2 plate with non-starchy vegetables (broccoli, carrots, cauliflower, salad greens, cucumbers, tomatoes). These foods contribute very few carbohydrates, and they add fiber to the meal. Fill 1/4 plate with lean protein (meat, turkey, fish, seafood, eggs, nuts, cheese, cottage cheese, nut butter, tofu, edamame). Fill 1/4 plate with carbohydrates/starches (grains, fruits, starchy vegetables, beans, yogurt, milk). Aim for at least half of daily grains as whole grains.   Provided education on carbohydrate (carb) counting. Discussed the following:  Foods and beverages that contribute carbohydrates (fruits, grains, legumes, milk, yogurt, starchy vegetables, sugar added to foods, sugar-sweetened beverages)  Foods that contribute no or minimal carbohydrates (protein, fats, non-starchy vegetables)  How to use portions of food that are 15 grams of carbohydrate to facilitate counting; Gave examples of portions  How to read a food label to count carbohydrates  Advised  patient to aim for 30-45 grams of carbohydrate per meal and 15 grams of carbohydrate per snack  Discussed using diabetesfoodhub.org for recipe ideas.     Educational Handouts: ADA Plate Method, NCM Carbohydrate Counting for People with Diabetes, Nutrition Label     *Patient expressed understanding of the education provided and denied any additional questions/concerns.       Nutrition Monitoring and Evaluation   Intentional weight loss of 0.5-2 lb per week, trending toward a clinically significant weight loss of 5-10% of current body weight - no update  Reduced A1c - no update  Reduced LDL - no update       Patient's Goals:   Patient will limit sugary beverages to 3 cans per week - will assess at next visit   Switch to a sugar free creamer for your coffee - will assess at next visit   Go back to eating eggs for breakfast - met, ongoing     Readiness to Change : Good  Level of Understanding : Good  Anticipated Compliant : Good     Follow-up: 3 months

## 2024-04-29 ENCOUNTER — TREATMENT (OUTPATIENT)
Dept: PHYSICAL THERAPY | Facility: CLINIC | Age: 79
End: 2024-04-29
Payer: MEDICARE

## 2024-04-29 DIAGNOSIS — M25.511 CHRONIC PAIN OF BOTH SHOULDERS: Primary | ICD-10-CM

## 2024-04-29 DIAGNOSIS — G89.29 CHRONIC PAIN OF BOTH SHOULDERS: Primary | ICD-10-CM

## 2024-04-29 DIAGNOSIS — S46.001S ROTATOR CUFF INJURY, RIGHT, SEQUELA: ICD-10-CM

## 2024-04-29 DIAGNOSIS — M25.512 CHRONIC PAIN OF BOTH SHOULDERS: Primary | ICD-10-CM

## 2024-04-29 PROCEDURE — 97110 THERAPEUTIC EXERCISES: CPT | Mod: GP,CQ | Performed by: PHYSICAL THERAPY ASSISTANT

## 2024-04-29 NOTE — PROGRESS NOTES
"  Physical Therapy    Physical Therapy Treatment      Patient Name: Celeste Murray  MRN: [id number]  Today's Date: 4/29/2024     PT Therapeutic Procedures Time Entry  Therapeutic Exercise Time Entry: 40  [id number]-[id number]  Insurance : [employer]   Authorization : none  Visit #4    Assessment:  Did well with shoulder exercises in supine and a little better form with chin tucks in supine.     Plan:  Seated press, flex as able      1` DX M25.241  Current Problem:   1. Chronic pain of both shoulders  Follow Up In Physical Therapy      2. Rotator cuff injury, right, sequela  Follow Up In Physical Therapy            Subjective    Felt she developed a \"muscle strain\" in her neck after performing the C-spine rotations.     5/10 pain L shoulder, neck      Objective   Nodding her head vs. retraction     Treatments:  HP to C-spine x 10 min  Seated chin tucks x 3  Supine chin tucks 2 x 10   Supine scap retractions x10  Supine towel shoulder flexion x10  Supine ER YTB 2 x 10   Supine YTB horiz abduction 2 x 10   C-spine  flex/ext w/ towel x 10 ea  C-spine rotation w/ towel x 5 ea  UBE 2/2  Neer Press up, supine--not today   Seated press attempted--not today     EDUCATION:   Issued/reviewed HEP to be performed 2x/day    Goals:  Active       PT Problem       Increase neck AROM to 30-35 degrees lateral flexion and L rotation to 60 degrees to improve neck mobility with ADLs.       Start:  03/21/24    Expected End:  06/03/24            Increase R shoulder AROM to at least 130 degrees- flexion, functional ER reach to T2 and functional IR reach to at least L1 and B abduction to at least 120 degrees to improve reaching and dressing.       Start:  03/21/24    Expected End:  06/03/24            Increase B shoulder MMT by at least 1/3 grade to improve reaching and lifting.       Start:  03/21/24    Expected End:  06/03/24            Pt will report at least 75% decrease in pain to improve functional use of arm for ADLs and sleeping.       Start:  " 03/21/24    Expected End:  06/03/24            Improve Quick DASH by at least 15.91 points (MDIC).       Start:  03/21/24    Expected End:  06/03/24

## 2024-05-06 ENCOUNTER — TREATMENT (OUTPATIENT)
Dept: PHYSICAL THERAPY | Facility: CLINIC | Age: 79
End: 2024-05-06
Payer: MEDICARE

## 2024-05-06 DIAGNOSIS — S46.001S ROTATOR CUFF INJURY, RIGHT, SEQUELA: ICD-10-CM

## 2024-05-06 DIAGNOSIS — G89.29 CHRONIC PAIN OF BOTH SHOULDERS: ICD-10-CM

## 2024-05-06 DIAGNOSIS — M25.511 CHRONIC PAIN OF BOTH SHOULDERS: ICD-10-CM

## 2024-05-06 DIAGNOSIS — M25.512 CHRONIC PAIN OF BOTH SHOULDERS: ICD-10-CM

## 2024-05-06 PROCEDURE — 97110 THERAPEUTIC EXERCISES: CPT | Mod: GP,CQ

## 2024-05-06 NOTE — PROGRESS NOTES
"  Physical Therapy    Physical Therapy Treatment      Patient Name: Celeste Murray  MRN: [id number]  Today's Date: 5/6/2024     PT Therapeutic Procedures Time Entry  Therapeutic Exercise Time Entry: 55  Insurance : [place] [employer]   Authorization : none  Visit #5    Assessment:  VC's for decreased guarding, which was effective for pain management    Plan:  YTB all planes as able, AA wall wash, Sh iso's      1` DX M25.511  Current Problem:   1. Rotator cuff injury, right, sequela  Follow Up In Physical Therapy      2. Chronic pain of both shoulders  Follow Up In Physical Therapy            Subjective    \"R shoulder is very painful today.\"    8/10 pain R shoulder, neck      Objective   R shoulder AROM 70` initially    Treatments:  R shoulder PROM/Gr. 2 mobs x 10 min  Supine chin tucks x 10   Neer press up x 10, towel roll  Wand ER x 10, towel roll  Supine scap retractions x 10  Supine ER YTB 2x10   C-spine  flex/ext w/ towel x 10 ea  C-spine rotation w/ towel x 10 ea  UBE 2/2  YTB-rows, ext, IR x 10 ea  HP R shoulder/c-spine seated EOS    Goals:  Active       PT Problem       Increase neck AROM to 30-35 degrees lateral flexion and L rotation to 60 degrees to improve neck mobility with ADLs.       Start:  03/21/24    Expected End:  06/03/24            Increase R shoulder AROM to at least 130 degrees- flexion, functional ER reach to T2 and functional IR reach to at least L1 and B abduction to at least 120 degrees to improve reaching and dressing.       Start:  03/21/24    Expected End:  06/03/24            Increase B shoulder MMT by at least 1/3 grade to improve reaching and lifting.       Start:  03/21/24    Expected End:  06/03/24            Pt will report at least 75% decrease in pain to improve functional use of arm for ADLs and sleeping.       Start:  03/21/24    Expected End:  06/03/24            Improve Quick DASH by at least 15.91 points (MDIC).       Start:  03/21/24    Expected End:  06/03/24              "

## 2024-05-13 ENCOUNTER — TREATMENT (OUTPATIENT)
Dept: PHYSICAL THERAPY | Facility: CLINIC | Age: 79
End: 2024-05-13
Payer: MEDICARE

## 2024-05-13 DIAGNOSIS — G89.29 CHRONIC PAIN OF BOTH SHOULDERS: Primary | ICD-10-CM

## 2024-05-13 DIAGNOSIS — M25.511 CHRONIC PAIN OF BOTH SHOULDERS: Primary | ICD-10-CM

## 2024-05-13 DIAGNOSIS — S46.001S ROTATOR CUFF INJURY, RIGHT, SEQUELA: ICD-10-CM

## 2024-05-13 DIAGNOSIS — M25.512 CHRONIC PAIN OF BOTH SHOULDERS: Primary | ICD-10-CM

## 2024-05-13 PROCEDURE — 97110 THERAPEUTIC EXERCISES: CPT | Mod: GP,CQ

## 2024-05-13 NOTE — PROGRESS NOTES
"  Physical Therapy    Physical Therapy Treatment      Patient Name: Celeste Murray  MRN: [id number]  Today's Date: 5/13/2024     PT Therapeutic Procedures Time Entry  Therapeutic Exercise Time Entry: 45  Insurance : [place] [employer]   Authorization : none  Visit #6    Assessment:  Pt able to elevate R shoulder in flexion, however abduction and ER were painful and pt reported spasms.  Attempted ball on table stopped 2` LBP.    Plan:  YTB all planes as able, AA wall wash, Sh iso's. Seated Dusting      1` DX M25.511  Current Problem:   1. Chronic pain of both shoulders  Follow Up In Physical Therapy      2. Rotator cuff injury, right, sequela  Follow Up In Physical Therapy              Subjective    \"I feel better when I leave here, but I use it then it is sore again, no reall carry over.\"    8/10 pain R shoulder, neck      Objective   R shoulder AROM 130` initially    Treatments:  Seated HP to c-spine prior to ex's  R UT(seated/shoulder PROM/Gr. 2 mobs(supine) x 10 min  Supine chin tucks x 10   Neer press up x 10, towel roll  AROM supine sh flexion x 10  Supine Bilat ER x 20, YTB  S/L abd x 5  Supine scap retractions x 10  Supine ER YTB 2x10   R shoulder iso-ext/flex x 10 ea  UBE 2/2    Goals:  Active       PT Problem       Increase neck AROM to 30-35 degrees lateral flexion and L rotation to 60 degrees to improve neck mobility with ADLs.       Start:  03/21/24    Expected End:  06/03/24            Increase R shoulder AROM to at least 130 degrees- flexion, functional ER reach to T2 and functional IR reach to at least L1 and B abduction to at least 120 degrees to improve reaching and dressing.       Start:  03/21/24    Expected End:  06/03/24            Increase B shoulder MMT by at least 1/3 grade to improve reaching and lifting.       Start:  03/21/24    Expected End:  06/03/24            Pt will report at least 75% decrease in pain to improve functional use of arm for ADLs and sleeping.       Start:  03/21/24    Expected " End:  06/03/24            Improve Quick DASH by at least 15.91 points (MDIC).       Start:  03/21/24    Expected End:  06/03/24

## 2024-05-20 ENCOUNTER — OFFICE VISIT (OUTPATIENT)
Dept: PRIMARY CARE | Facility: CLINIC | Age: 79
End: 2024-05-20
Payer: MEDICARE

## 2024-05-20 ENCOUNTER — TREATMENT (OUTPATIENT)
Dept: PHYSICAL THERAPY | Facility: CLINIC | Age: 79
End: 2024-05-20
Payer: MEDICARE

## 2024-05-20 VITALS
SYSTOLIC BLOOD PRESSURE: 132 MMHG | HEART RATE: 76 BPM | TEMPERATURE: 97.8 F | DIASTOLIC BLOOD PRESSURE: 85 MMHG | BODY MASS INDEX: 42.69 KG/M2 | WEIGHT: 232 LBS | OXYGEN SATURATION: 97 % | RESPIRATION RATE: 18 BRPM | HEIGHT: 62 IN

## 2024-05-20 DIAGNOSIS — M25.511 CHRONIC PAIN OF BOTH SHOULDERS: ICD-10-CM

## 2024-05-20 DIAGNOSIS — S46.001S ROTATOR CUFF INJURY, RIGHT, SEQUELA: ICD-10-CM

## 2024-05-20 DIAGNOSIS — G89.29 CHRONIC PAIN OF BOTH SHOULDERS: ICD-10-CM

## 2024-05-20 DIAGNOSIS — M25.511 CHRONIC RIGHT SHOULDER PAIN: Primary | ICD-10-CM

## 2024-05-20 DIAGNOSIS — M25.512 CHRONIC PAIN OF BOTH SHOULDERS: ICD-10-CM

## 2024-05-20 DIAGNOSIS — G89.29 CHRONIC RIGHT SHOULDER PAIN: Primary | ICD-10-CM

## 2024-05-20 PROCEDURE — 3075F SYST BP GE 130 - 139MM HG: CPT | Performed by: FAMILY MEDICINE

## 2024-05-20 PROCEDURE — 1160F RVW MEDS BY RX/DR IN RCRD: CPT | Performed by: FAMILY MEDICINE

## 2024-05-20 PROCEDURE — 99213 OFFICE O/P EST LOW 20 MIN: CPT | Performed by: FAMILY MEDICINE

## 2024-05-20 PROCEDURE — 1125F AMNT PAIN NOTED PAIN PRSNT: CPT | Performed by: FAMILY MEDICINE

## 2024-05-20 PROCEDURE — 3079F DIAST BP 80-89 MM HG: CPT | Performed by: FAMILY MEDICINE

## 2024-05-20 PROCEDURE — 97110 THERAPEUTIC EXERCISES: CPT | Mod: GP,CQ

## 2024-05-20 PROCEDURE — 1159F MED LIST DOCD IN RCRD: CPT | Performed by: FAMILY MEDICINE

## 2024-05-20 RX ORDER — CYCLOBENZAPRINE HCL 10 MG
10 TABLET ORAL NIGHTLY PRN
Qty: 60 TABLET | Refills: 0 | Status: SHIPPED | OUTPATIENT
Start: 2024-05-20 | End: 2024-07-19

## 2024-05-20 ASSESSMENT — ENCOUNTER SYMPTOMS
LOSS OF SENSATION IN FEET: 0
ABDOMINAL PAIN: 0
SHORTNESS OF BREATH: 0
CHEST TIGHTNESS: 0
OCCASIONAL FEELINGS OF UNSTEADINESS: 0
JOINT SWELLING: 0
COUGH: 0
DEPRESSION: 0
ACTIVITY CHANGE: 0

## 2024-05-20 ASSESSMENT — PATIENT HEALTH QUESTIONNAIRE - PHQ9
SUM OF ALL RESPONSES TO PHQ9 QUESTIONS 1 AND 2: 0
1. LITTLE INTEREST OR PLEASURE IN DOING THINGS: NOT AT ALL
2. FEELING DOWN, DEPRESSED OR HOPELESS: NOT AT ALL

## 2024-05-20 ASSESSMENT — PAIN SCALES - GENERAL: PAINLEVEL: 7

## 2024-05-20 NOTE — PROGRESS NOTES
"  Physical Therapy    Physical Therapy Treatment      Patient Name: Celeste Murray  MRN: [id number]  Today's Date: 5/20/2024     PT Therapeutic Procedures Time Entry  Therapeutic Exercise Time Entry: 40  Insurance : [place] [employer]   Authorization : none  Visit #6    Assessment:  No spasms reported today and pt had no pain with isometrics    Plan:  Cont after injection as needed      1` DX M25.511  Current Problem:   1. Rotator cuff injury, right, sequela  Follow Up In Physical Therapy      2. Chronic pain of both shoulders  Follow Up In Physical Therapy              Subjective    \"It was a bad weekend, my R shoulder locked up on me and all I could do was the neck ex's.  I am getting an injection today.\"    8/10 pain R shoulder, neck      Objective   R shoulder AROM 130` initially    Treatments:    R UT STM, Gr. 2 x 5 min  Seated chin tucks, Tuck w/ rot, tuck w/ SB x 15 ea, bilat  Circ/Pend-pain  Wand ext x 10, 5\"  Seated pulleys x 15  R shoulder iso-ext/flex/abd/IR/ER x 15 ea  HP to R UT EOS  Goals:  Active       PT Problem       Increase neck AROM to 30-35 degrees lateral flexion and L rotation to 60 degrees to improve neck mobility with ADLs.       Start:  03/21/24    Expected End:  06/03/24            Increase R shoulder AROM to at least 130 degrees- flexion, functional ER reach to T2 and functional IR reach to at least L1 and B abduction to at least 120 degrees to improve reaching and dressing.       Start:  03/21/24    Expected End:  06/03/24            Increase B shoulder MMT by at least 1/3 grade to improve reaching and lifting.       Start:  03/21/24    Expected End:  06/03/24            Pt will report at least 75% decrease in pain to improve functional use of arm for ADLs and sleeping.       Start:  03/21/24    Expected End:  06/03/24            Improve Quick DASH by at least 15.91 points (MDIC).       Start:  03/21/24    Expected End:  06/03/24              "

## 2024-05-20 NOTE — PROGRESS NOTES
"Subjective   Patient ID: Celeste Murray is a 79 y.o. female who presents for chronic R shoulder pain.     Pt has chronic right shoulder pain due to a fall on the ice in Jan 2023. PT is helping but not getting the results she wants. Pt requests a steroid shot in her R shoulder. Cannot raise R arm above 90 degrees. Just had a cortisone shot in the L shoulder 2 weeks ago (unsure of location). Pt endorses muscle spasms in RUE and R-sided cervical radiculopathy.     LEFT SHOULDER MRI 8/2023  IMPRESSION:   -ROTATOR CUFF AND LONG HEAD OF THE BICEPS TENDINOSIS.   -OSTEOARTHRITIS.   -MILD SUBDELTOID/SUBACROMIAL BURSITIS     RIGHT SHOULDER MRI 8/2023  IMPRESSION:   -ROTATOR CUFF AND LONG HEAD OF THE BICEPS TENDINOSIS.   -OSTEOARTHRITIS.   -MILD SUBDELTOID/SUBACROMIAL BURSITIS.     Review of Systems   Constitutional:  Negative for activity change.   Respiratory:  Negative for cough, chest tightness and shortness of breath.    Gastrointestinal:  Negative for abdominal pain.   Musculoskeletal:  Negative for joint swelling.   All other systems reviewed and are negative.      Objective   /85 (BP Location: Right arm, Patient Position: Sitting, BP Cuff Size: Large adult)   Pulse 76   Temp 36.6 °C (97.8 °F) (Skin)   Resp 18   Ht 1.575 m (5' 2\")   Wt 105 kg (232 lb)   SpO2 97%   BMI 42.43 kg/m²     Physical Exam  Constitutional:       Appearance: Normal appearance.   Cardiovascular:      Rate and Rhythm: Normal rate and regular rhythm.   Pulmonary:      Effort: Pulmonary effort is normal.   Abdominal:      General: Abdomen is flat.      Palpations: Abdomen is soft.   Musculoskeletal:      Right shoulder: Tenderness and bony tenderness present. No swelling, effusion or crepitus. Decreased range of motion. Normal strength.      Left shoulder: Normal.   Skin:     General: Skin is warm and dry.   Neurological:      Mental Status: She is alert.     Assessment/Plan   Diagnoses and all orders for this visit:  Chronic right " shoulder pain  -    Suspect multifactorial, 2/2 rotator cuff tendonitis and adhesive capsulitis. She has previously followed with orthopedics at outside facility. Discussed referral to ortho vs injection in the office today. Will refer to orthopedics. Patient agreeable with plan. Continue PT    - cyclobenzaprine (Flexeril) 10 mg tablet; Take 1 tablet (10 mg) by mouth as needed at bedtime for muscle spasms.  -     Referral to Orthopaedic Surgery; Future     Purnima Horowitz, MS3    RTC for medicare wellness visit    Patient was seen and examined by myself in conjunction with medical student. I have edited note above. - NEVAEH Becerra

## 2024-05-28 ENCOUNTER — TREATMENT (OUTPATIENT)
Dept: PHYSICAL THERAPY | Facility: CLINIC | Age: 79
End: 2024-05-28
Payer: MEDICARE

## 2024-05-28 DIAGNOSIS — M25.511 CHRONIC PAIN OF BOTH SHOULDERS: ICD-10-CM

## 2024-05-28 DIAGNOSIS — S46.001S ROTATOR CUFF INJURY, RIGHT, SEQUELA: ICD-10-CM

## 2024-05-28 DIAGNOSIS — M25.512 CHRONIC PAIN OF BOTH SHOULDERS: ICD-10-CM

## 2024-05-28 DIAGNOSIS — G89.29 CHRONIC PAIN OF BOTH SHOULDERS: ICD-10-CM

## 2024-05-28 PROCEDURE — 97110 THERAPEUTIC EXERCISES: CPT | Mod: GP,CQ

## 2024-05-28 NOTE — PROGRESS NOTES
"  Physical Therapy    Physical Therapy Treatment      Patient Name: Celeste Murray  MRN: [id number]  Today's Date: 5/28/2024     PT Therapeutic Procedures Time Entry  Therapeutic Exercise Time Entry: 30  Insurance : [place] [employer]   Authorization : none  Visit #8  (Re-eval)    Assessment:  Re-eval, Pt did well w/ ex's today, no spasms.    Plan:  Cont per ins      1` DX M25.511  Current Problem:   1. Rotator cuff injury, right, sequela  Follow Up In Physical Therapy      2. Chronic pain of both shoulders  Follow Up In Physical Therapy          Subjective    \"I couldn't get the injection until I see a shoulder specialist.\"    5/10 pain R shoulder, neck      Objective   Observation: postural assessment shows forward head, rounded shoulders, scapular protraction      Neck AROM (degrees):   Flexion- 40  Extension- 60  Lateral flexion- R- 25; L- 30  Rotation- R- 60; L- 50     Shoulder AROM (R/L in degrees):   Flexion- 145/ 145  Abduction- 125/ 150  Functional ER reach- T3/ T3  Functional IR reach- T12/ T10    Shoulder MMT (R/L):   Flexion- 4+/5; 5/5  Abduction- 4+/5; 4+/5  ER- 4/5; 4+/5  IR- 4+/5; 4+/5  Biceps- 4+/5; 4+/5  Triceps- 4+/5; 4+/5     Outcome Measures:  Quick DASH: 29= 40.90%    Treatments:  UBE 2/2  Red band-rows, ext, ir, flexion, er x 20 ea  AA sh flexion w/ ball on table x 20      Goals:  Active       PT Problem       Increase neck AROM to 30-35 degrees lateral flexion and L rotation to 60 degrees to improve neck mobility with ADLs.       Start:  03/21/24    Expected End:  06/03/24            Increase R shoulder AROM to at least 130 degrees- flexion, functional ER reach to T2 and functional IR reach to at least L1 and B abduction to at least 120 degrees to improve reaching and dressing.       Start:  03/21/24    Expected End:  06/03/24            Increase B shoulder MMT by at least 1/3 grade to improve reaching and lifting.       Start:  03/21/24    Expected End:  06/03/24            Pt will report at least 75% " decrease in pain to improve functional use of arm for ADLs and sleeping.       Start:  03/21/24    Expected End:  06/03/24            Improve Quick DASH by at least 15.91 points (MDIC).       Start:  03/21/24    Expected End:  06/03/24

## 2024-06-04 ENCOUNTER — HOSPITAL ENCOUNTER (OUTPATIENT)
Dept: RADIOLOGY | Facility: CLINIC | Age: 79
Discharge: HOME | End: 2024-06-04
Payer: MEDICARE

## 2024-06-04 ENCOUNTER — OFFICE VISIT (OUTPATIENT)
Dept: ORTHOPEDIC SURGERY | Facility: CLINIC | Age: 79
End: 2024-06-04
Payer: MEDICARE

## 2024-06-04 DIAGNOSIS — M25.511 RIGHT SHOULDER PAIN, UNSPECIFIED CHRONICITY: ICD-10-CM

## 2024-06-04 DIAGNOSIS — G89.29 CHRONIC RIGHT SHOULDER PAIN: ICD-10-CM

## 2024-06-04 DIAGNOSIS — M25.511 CHRONIC RIGHT SHOULDER PAIN: ICD-10-CM

## 2024-06-04 DIAGNOSIS — M19.011 ARTHRITIS OF RIGHT SHOULDER REGION: Primary | ICD-10-CM

## 2024-06-04 PROCEDURE — 2500000004 HC RX 250 GENERAL PHARMACY W/ HCPCS (ALT 636 FOR OP/ED): Performed by: STUDENT IN AN ORGANIZED HEALTH CARE EDUCATION/TRAINING PROGRAM

## 2024-06-04 PROCEDURE — 99214 OFFICE O/P EST MOD 30 MIN: CPT | Mod: 25 | Performed by: STUDENT IN AN ORGANIZED HEALTH CARE EDUCATION/TRAINING PROGRAM

## 2024-06-04 PROCEDURE — 20610 DRAIN/INJ JOINT/BURSA W/O US: CPT | Performed by: STUDENT IN AN ORGANIZED HEALTH CARE EDUCATION/TRAINING PROGRAM

## 2024-06-04 PROCEDURE — 2500000005 HC RX 250 GENERAL PHARMACY W/O HCPCS: Performed by: STUDENT IN AN ORGANIZED HEALTH CARE EDUCATION/TRAINING PROGRAM

## 2024-06-04 PROCEDURE — 73030 X-RAY EXAM OF SHOULDER: CPT | Mod: RT

## 2024-06-04 PROCEDURE — 99204 OFFICE O/P NEW MOD 45 MIN: CPT | Performed by: STUDENT IN AN ORGANIZED HEALTH CARE EDUCATION/TRAINING PROGRAM

## 2024-06-04 PROCEDURE — 73030 X-RAY EXAM OF SHOULDER: CPT | Mod: RIGHT SIDE | Performed by: RADIOLOGY

## 2024-06-04 PROCEDURE — 20610 DRAIN/INJ JOINT/BURSA W/O US: CPT | Mod: RT | Performed by: STUDENT IN AN ORGANIZED HEALTH CARE EDUCATION/TRAINING PROGRAM

## 2024-06-04 RX ORDER — LIDOCAINE HYDROCHLORIDE 10 MG/ML
5 INJECTION INFILTRATION; PERINEURAL
Status: COMPLETED | OUTPATIENT
Start: 2024-06-04 | End: 2024-06-04

## 2024-06-04 RX ORDER — TRIAMCINOLONE ACETONIDE 40 MG/ML
80 INJECTION, SUSPENSION INTRA-ARTICULAR; INTRAMUSCULAR
Status: COMPLETED | OUTPATIENT
Start: 2024-06-04 | End: 2024-06-04

## 2024-06-04 RX ADMIN — TRIAMCINOLONE ACETONIDE 80 MG: 400 INJECTION, SUSPENSION INTRA-ARTICULAR; INTRAMUSCULAR at 12:14

## 2024-06-04 RX ADMIN — LIDOCAINE HYDROCHLORIDE 5 ML: 10 INJECTION, SOLUTION INFILTRATION; PERINEURAL at 12:14

## 2024-06-04 NOTE — PROGRESS NOTES
CHIEF COMPLAINT:   Chief Complaint   Patient presents with    Right Shoulder - Pain     History: 79 y.o. female presents to the office today for evaluation of her right shoulder.  The patient is right-hand dominant.  She is no longer working.  She states that she actually injured her shoulder at her previous job about a year and a half ago when she fell directly onto the right shoulder.  Since that time she has had extensive physical therapy.  She actually is in therapy again has not been in therapy for more than 3 months at this point.  Continues to have pain and mechanical symptoms of the right shoulder.  States that the shoulder locks sometimes.  Not had any injections.  Pain is really deep and anterior.  Also has some cervical complaints today.      Past medical history, past surgical history, medications, allergies, family history, social history, and review of systems were reviewed today.    A 12 point review of systems was negative other than as stated in the HPI.    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    Hyperopia of both eyes with astigmatism and presbyopia    Other conditions influencing health status     Arthritis    Personal history of other diseases of the circulatory system     History of cardiac murmur    Personal history of other endocrine, nutritional and metabolic disease     History of thyroid disease    Pure hypercholesterolemia, unspecified     High cholesterol        Allergies   Allergen Reactions    Iodinated Contrast Media Anaphylaxis    Propoxyphene Unknown, Anaphylaxis and Other    Propoxyphene Hcl Anaphylaxis    Barium Sulfate Unknown and Other    Lisinopril Other     Gastrointestinal upset        Past Surgical History:   Procedure Laterality Date    MR HEAD ANGIO WO IV CONTRAST  12/27/2012     MR HEAD ANGIO WO IV CONTRAST 12[place]27/2012 [facility] [place] [facility]    OTHER SURGICAL HISTORY  09/08/2014    History Of Prior Surgery    TONSILLECTOMY  08/10/2015    Tonsillectomy        Family History   Problem Relation Name Age of Onset    Diabetes Sister      Diabetes Brother      Diabetes Sibling          Social History     Socioeconomic History    Marital status:      Spouse name: Not on file    Number of children: Not on file    Years of education: Not on file    Highest education level: Not on file   Occupational History    Not on file   Tobacco Use    Smoking status: Never     Passive exposure: Past    Smokeless tobacco: Never   Substance and Sexual Activity    Alcohol use: Not Currently    Drug use: Never    Sexual activity: Not on file   Other Topics Concern    Not on file   Social History Narrative    Not on file     Social Determinants of Health     Financial Resource Strain: Not on file   Food Insecurity: Unknown (2/6/2024)    Received from [facility] Vital Sign     Worried About Running Out of Food in the Last Year: Never true     Ran Out of Food in the Last Year: Not on file   Transportation Needs: Not on file   Physical Activity: Not on file   Stress: Not on file   Social Connections: Not on file   Intimate Partner Violence: Not on file   Housing Stability: Not on file        CURRENT MEDICATIONS:   Current Outpatient Medications   Medication Sig Dispense Refill    acetaminophen (Tylenol 8 Hour) 650 mg ER tablet Take by mouth. Use as directed      ascorbic acid (Vitamin C) 1,000 mg tablet Take 1 tablet (1,000 mg) by mouth once daily.      aspirin 81 mg EC tablet Take 1 tablet (81 mg) by mouth once daily. As directed      blood sugar diagnostic (Blood Glucose Test) strip For daily BG check 100 strip 5    blood-glucose meter misc For blood sugar checks 1 each 0    calcium carbonate 600 mg calcium (1,500 mg) tablet Take 1 tablet (600 mg) by mouth once daily.      cholecalciferol  "(Vitamin D3) 5,000 Units tablet Take 1 tablet (5,000 Units) by mouth once daily. 90 tablet 3    cyclobenzaprine (Flexeril) 10 mg tablet Take 1 tablet (10 mg) by mouth as needed at bedtime for muscle spasms. 60 tablet 0    fluticasone (Flonase) 50 mcg/actuation nasal spray 1 spray by Does not apply route once daily.      lancets (Lancets,Ultra Thin) misc For daily BG checks 100 each 2    losartan-hydrochlorothiazide (Hyzaar) 50-12.5 mg tablet Take 1 tablet by mouth once daily. 90 tablet 2    multivitamin (One Daily Multivitamin) tablet Take 1 tablet by mouth once daily.      zinc sulfate (Zincate) 220 (50 Zn) MG capsule Take 1 capsule (50 mg of elemental zinc) by mouth once daily.       No current facility-administered medications for this visit.       Physical Examination:      6/8/2023    10:28 AM 6/12/2023     9:32 AM 12/7/2023     2:18 PM 2/5/2024    12:27 PM 2/15/2024     2:53 PM 3/29/2024    11:01 AM 5/20/2024     3:43 PM   Vitals   Systolic 154 130 152 140 133  132   Diastolic 83 77 88 94 85  85   Heart Rate 72 64 78 69 84  76   Temp  36.6 °C (97.9 °F)   36.7 °C (98 °F)  36.6 °C (97.8 °F)   Resp       18   Height (in) 1.575 m (5' 2\") 1.575 m (5' 2\") 1.575 m (5' 2\")  1.575 m (5' 2\") 1.575 m (5' 2\") 1.575 m (5' 2\")   Weight (lb) 228 227 232.13 234 236.7  232   BMI 41.7 kg/m2 41.52 kg/m2 42.46 kg/m2 42.8 kg/m2 43.29 kg/m2 43.29 kg/m2 42.43 kg/m2   BSA (m2) 2.12 m2 2.12 m2 2.14 m2 2.15 m2 2.16 m2 2.16 m2 2.14 m2   Visit Report   Report Report Report  Report      There is no height or weight on file to calculate BMI.    Well-appearing, appears stated age, pleasant and cooperative, appropriate mood and behavior. Height and weight reviewed. Alert and oriented x3.  Auditory function intact.  No acute distress.  Intact ocular function, LILIYA, EOMI. Breathing is unlabored .  There is no evidence of jugular venous distension. Skin appearance is normal without evidence of rash or other lesions. 2+ radial pulses " bilaterally, fingers pink and wwp, good capillary refill, no pitting edema. No appreciable lymphadenopathy in bilateral upper extremities. SILT throughout both upper extremities, median/radial/ulnar/musculocutaneous/axillary nerve motor and sensory intact (except for abnormalities noted in focused musculoskeletal exam section below).     Neck exam: Full range of motion of the neck in flexion/extension and rotational movements. No significant areas of tenderness to palpation in the neck.    On exam of bilateral upper extremities, she has she has relatively preserved range of motion of the right shoulder in forward flexion, active forward flexion 130 compared to 140 on the left, external rotation though is limited, 10 degrees on the right compared to 30 on the left.  Internal rotation to the side on the right compared to L5 on the left.  Rotator cuff strength is preserved but painful on the right side.    Imaging: Radiographs of the right shoulder were performed today.  First interpreted by myself.  Findings consistent with glenohumeral arthritis with loss of glenohumeral joint space and osteophyte formation.  Humeral head is centered on the axillary view.    Assessment: Right shoulder osteoarthritis    Plan: Patient's symptoms, exam and imaging findings are consistent with end stage glenohumeral osteoarthritis.  We discussed the natural history of this.  We discussed that with shoulder osteoarthritis, there is loss of cartilage on both the humerus and glenoid, bony erosion of the glenoid, and extra bone formation called osteophytes on the humerus and glenoid.  We discussed that there are different patterns of arthritis.  Conservative treatment for glenohumeral arthritis is limited, and can include activity modification, injections and physical therapy, although with end stage bone on bone arthritis the benefits of physical therapy may be limited. The final option would be to have a shoulder replacement. After  discussion, the patient would like to proceed with injection.  Will have him follow-up in 3 months.    Injection was performed, please see separate procedure note, patient tolerated well.     Patient ID: Celeste Murray is a 79 y.o. female.    L Inj/Asp: R glenohumeral on 6/4/2024 12:14 PM  Indications: pain  Details: 22 G needle, anterior approach  Medications: 80 mg triamcinolone acetonide 40 mg/mL; 5 mL lidocaine 10 mg/mL (1 %)  Outcome: tolerated well, no immediate complications  Procedure, treatment alternatives, risks and benefits explained, specific risks discussed. Consent was given by the patient. Immediately prior to procedure a time out was called to verify the correct patient, procedure, equipment, support staff and site/side marked as required. Patient was prepped and draped in the usual sterile fashion.             Dragon software was used to dictate this note, please be aware that minor errors in transcription may be present.    Glen Karimi MD    Shoulder/Elbow Surgery  [facility]

## 2024-06-11 ENCOUNTER — TREATMENT (OUTPATIENT)
Dept: PHYSICAL THERAPY | Facility: CLINIC | Age: 79
End: 2024-06-11
Payer: MEDICARE

## 2024-06-11 DIAGNOSIS — M25.511 CHRONIC PAIN OF BOTH SHOULDERS: ICD-10-CM

## 2024-06-11 DIAGNOSIS — M25.512 CHRONIC PAIN OF BOTH SHOULDERS: ICD-10-CM

## 2024-06-11 DIAGNOSIS — G89.29 CHRONIC PAIN OF BOTH SHOULDERS: ICD-10-CM

## 2024-06-11 DIAGNOSIS — S46.001S ROTATOR CUFF INJURY, RIGHT, SEQUELA: ICD-10-CM

## 2024-06-11 PROCEDURE — 97110 THERAPEUTIC EXERCISES: CPT | Mod: GP,CQ

## 2024-06-11 NOTE — PROGRESS NOTES
"  Physical Therapy    Physical Therapy Treatment      Patient Name: Celeste Murray  MRN: [id number]  Today's Date: 6/11/2024     PT Therapeutic Procedures Time Entry  Therapeutic Exercise Time Entry: 35  Insurance : [place] [employer]   Authorization : 4 visits Auth'd(5/28/24-6/24/24)  Visit #9(2/4)  (Re-eval 5/28/24)    Assessment:  Pain relief from injection, however spasms returned toward EOS    Plan:  Decrease reps as needed      1` DX M25.511  Current Problem:   1. Rotator cuff injury, right, sequela  Follow Up In Physical Therapy      2. Chronic pain of both shoulders  Follow Up In Physical Therapy            Subjective    \"I am tired today, I had another appointment this morning.\"    0/10 pain R shoulder, neck      Objective   Observation: postural assessment shows forward head, rounded shoulders, scapular protraction      Neck AROM (degrees):   Flexion- 40  Extension- 60  Lateral flexion- R- 25; L- 30  Rotation- R- 60; L- 50     Shoulder AROM (R/L in degrees):   Flexion- 145/ 145  Abduction- 125/ 150  Functional ER reach- T3/ T3  Functional IR reach- T12/ T10    Shoulder MMT (R/L):   Flexion- 4+/5; 5/5  Abduction- 4+/5; 4+/5  ER- 4/5; 4+/5  IR- 4+/5; 4+/5  Biceps- 4+/5; 4+/5  Triceps- 4+/5; 4+/5     Outcome Measures:  Quick DASH: 29= 40.90%    Treatments:  HP to c-spine w/ ex's:  AA sh flexion on wall x 10  Wand ext x 10  Wand IR x 10  Pulleys x 20  UBE 2/2  Red band-Rows, bilat ext/ flex/IR/ER(not today) x 20 ea  Bicep curls, not today  Bent over Rows, not today      Goals:  Active       PT Problem       Increase neck AROM to 30-35 degrees lateral flexion and L rotation to 60 degrees to improve neck mobility with ADLs.       Start:  03/21/24    Expected End:  06/03/24            Increase R shoulder AROM to at least 130 degrees- flexion, functional ER reach to T2 and functional IR reach to at least L1 and B abduction to at least 120 degrees to improve reaching and dressing.       Start:  03/21/24    Expected End:  " 06/03/24            Increase B shoulder MMT by at least 1/3 grade to improve reaching and lifting.       Start:  03/21/24    Expected End:  06/03/24            Pt will report at least 75% decrease in pain to improve functional use of arm for ADLs and sleeping.       Start:  03/21/24    Expected End:  06/03/24            Improve Quick DASH by at least 15.91 points (MDIC).       Start:  03/21/24    Expected End:  06/03/24

## 2024-06-12 PROBLEM — H52.00 HYPEROPIA: Status: ACTIVE | Noted: 2019-05-21

## 2024-06-12 PROBLEM — H25.10 NUCLEAR SENILE CATARACT: Status: ACTIVE | Noted: 2024-06-12

## 2024-06-12 PROBLEM — K30 FUNCTIONAL DYSPEPSIA: Status: ACTIVE | Noted: 2024-06-12

## 2024-06-13 ENCOUNTER — OFFICE VISIT (OUTPATIENT)
Dept: CARDIOLOGY | Facility: HOSPITAL | Age: 79
End: 2024-06-13
Payer: MEDICARE

## 2024-06-13 ENCOUNTER — APPOINTMENT (OUTPATIENT)
Dept: CARDIOLOGY | Facility: HOSPITAL | Age: 79
End: 2024-06-13
Payer: MEDICARE

## 2024-06-13 VITALS
SYSTOLIC BLOOD PRESSURE: 129 MMHG | DIASTOLIC BLOOD PRESSURE: 82 MMHG | HEART RATE: 86 BPM | HEIGHT: 62 IN | BODY MASS INDEX: 42.51 KG/M2 | WEIGHT: 231 LBS | OXYGEN SATURATION: 96 %

## 2024-06-13 DIAGNOSIS — R06.02 SHORTNESS OF BREATH: ICD-10-CM

## 2024-06-13 DIAGNOSIS — E66.01 CLASS 3 SEVERE OBESITY WITHOUT SERIOUS COMORBIDITY WITH BODY MASS INDEX (BMI) OF 40.0 TO 44.9 IN ADULT, UNSPECIFIED OBESITY TYPE (MULTI): ICD-10-CM

## 2024-06-13 DIAGNOSIS — R01.1 MURMUR, HEART: ICD-10-CM

## 2024-06-13 DIAGNOSIS — I10 HYPERTENSION, UNSPECIFIED TYPE: Primary | ICD-10-CM

## 2024-06-13 PROCEDURE — 99214 OFFICE O/P EST MOD 30 MIN: CPT | Performed by: INTERNAL MEDICINE

## 2024-06-13 PROCEDURE — 3079F DIAST BP 80-89 MM HG: CPT | Performed by: INTERNAL MEDICINE

## 2024-06-13 PROCEDURE — 1126F AMNT PAIN NOTED NONE PRSNT: CPT | Performed by: INTERNAL MEDICINE

## 2024-06-13 PROCEDURE — 1159F MED LIST DOCD IN RCRD: CPT | Performed by: INTERNAL MEDICINE

## 2024-06-13 PROCEDURE — 3074F SYST BP LT 130 MM HG: CPT | Performed by: INTERNAL MEDICINE

## 2024-06-13 PROCEDURE — 1036F TOBACCO NON-USER: CPT | Performed by: INTERNAL MEDICINE

## 2024-06-13 ASSESSMENT — ENCOUNTER SYMPTOMS
DEPRESSION: 0
LOSS OF SENSATION IN FEET: 0
OCCASIONAL FEELINGS OF UNSTEADINESS: 0

## 2024-06-13 ASSESSMENT — PAIN SCALES - GENERAL: PAINLEVEL: 0-NO PAIN

## 2024-06-13 NOTE — PROGRESS NOTES
Subjective   Celeste Murray is a 79 y.o. female.    Past medical history  Former smoker  Hypertension on losartan hydrochlorothiazide combination  Obesity and BMI of 42  Echocardiogram in September 2022 low normal LVEF with some regional wall motion abnormality     Social history:Lives alone, works part-time 5 days of every 2 weeks, uses walker to walk    No drug or alcohol abuse history    Chief Complaint:  No chief complaint on file.    HPI  Patient is seen in the past 3-4 times because of dyspnea on exertion.  I have advised patient to get a CT/MRI cardiac which patient refused.    No significant change in symptoms.  Patient still complains of shortness of breath with exertion.  No resting dyspnea.  No orthopnea.  No lower extremity edema.  No chest pain.  No palpitation or syncope episodes.    Patient is focusing on losing weight and trying to exert herself more to improve her condition    ROS  No significant complaints    Objective   Constitutional:       Appearance: Not in distress.   Neck:      Vascular: JVD normal.   Pulmonary:      Effort: Pulmonary effort is normal.      Breath sounds: Normal breath sounds.   Cardiovascular:      PMI at left midclavicular line. Normal rate. Regular rhythm. Normal S1. Normal S2.       Murmurs: There is a systolic murmur.   Edema:     Peripheral edema absent.   Abdominal:      General: Bowel sounds are normal.      Palpations: Abdomen is soft.   Skin:     General: Skin is warm and dry.   Neurological:      General: No focal deficit present.      Mental Status: Alert and oriented to person, place and time.           Lab Review:   Lab Results   Component Value Date     02/12/2024    K 4.0 02/12/2024     02/12/2024    CO2 29 02/12/2024    BUN 15 02/12/2024    CREATININE 0.89 02/12/2024    GLUCOSE 91 02/12/2024    CALCIUM 9.6 02/12/2024     Lab Results   Component Value Date    WBC 6.0 08/24/2022    HGB 13.8 08/24/2022    HCT 42.6 08/24/2022    MCV 96 08/24/2022      08/24/2022     Lab Results   Component Value Date    CHOL 196 02/12/2024    TRIG 86 02/12/2024    HDL 68.0 02/12/2024    LDLDIRECT 84 06/04/2021 September 2022 transthoracic echo  CONCLUSIONS:   1. Left ventricular systolic function is low normal with a 50-55% estimated ejection fraction.   2. Spectral Doppler shows an impaired relaxation pattern of left ventricular diastolic filling.   3. There is mild to moderate hypokinesis of the basal and mid inferoposterior wall.   4. Mild mitral valve regurgitation.   5. The estimated PASP is 31 mmHg.  Assessment/Plan   There were no encounter diagnoses.  Patient is here for follow-up visit for dyspnea on exertion.  Known case of obesity and hypertension.  In the past echo had shown low normal EF with regional wall motion abnormality.  Patient was advised assessment with a cardiac CT/cardiac MRI which he refused.  Euvolemic status on physical examination.  Murmur can be heard which is new.    Murmur: Echo cardiogram orders placed for assessment    Shortness of breath: Could be multifactorial related to obesity and deconditioning.  However based on her echo we need to rule out a cardiac etiology specifically significant CAD which patient is not accepting.  She is asking for 3 more months and we will go along with further testing if she does not improve in the next 3 months    Hypertension: Well-managed on current medication    Obesity: Patient is working on weight loss.    Follow-up in 3 months

## 2024-06-18 ENCOUNTER — TREATMENT (OUTPATIENT)
Dept: PHYSICAL THERAPY | Facility: CLINIC | Age: 79
End: 2024-06-18
Payer: MEDICARE

## 2024-06-18 DIAGNOSIS — G89.29 CHRONIC PAIN OF BOTH SHOULDERS: ICD-10-CM

## 2024-06-18 DIAGNOSIS — M25.512 CHRONIC PAIN OF BOTH SHOULDERS: ICD-10-CM

## 2024-06-18 DIAGNOSIS — M25.511 CHRONIC PAIN OF BOTH SHOULDERS: ICD-10-CM

## 2024-06-18 DIAGNOSIS — S46.001S ROTATOR CUFF INJURY, RIGHT, SEQUELA: ICD-10-CM

## 2024-06-18 PROCEDURE — 97110 THERAPEUTIC EXERCISES: CPT | Mod: GP,CQ

## 2024-06-18 NOTE — PROGRESS NOTES
"    Physical Therapy    Physical Therapy Treatment      Patient Name: Celeste Murray  MRN: [id number]  Today's Date: 6/18/2024     PT Therapeutic Procedures Time Entry  Therapeutic Exercise Time Entry: 40  Insurance : [employer]   Authorization : 4 visits Auth'd(5/28/24-6/24/24)  Visit #10(2/4)  (Re-eval 5/28/24)    Assessment:  Pt was able to complete rx today.  Needs re-eval    Plan:  Cont per 1` PT      1` DX M25.511  Current Problem:   1. Rotator cuff injury, right, sequela  Follow Up In Physical Therapy      2. Chronic pain of both shoulders  Follow Up In Physical Therapy            Subjective    \"I took some Tylenol and the pain is 6/10.  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  He said to continue PT.\"    6/10 pain R shoulder, neck      Objective   Observation: postural assessment shows forward head, rounded shoulders, scapular protraction      Neck AROM (degrees):   Flexion- 40  Extension- 60  Lateral flexion- R- 25; L- 30  Rotation- R- 60; L- 50     Shoulder AROM (R/L in degrees):   Flexion- 145/ 145  Abduction- 125/ 150  Functional ER reach- T3/ T3  Functional IR reach- T12/ T10    Shoulder MMT (R/L):   Flexion- 4+/5; 5/5  Abduction- 4+/5; 4+/5  ER- 4/5; 4+/5  IR- 4+/5; 4+/5  Biceps- 4+/5; 4+/5  Triceps- 4+/5; 4+/5     Outcome Measures:  Quick DASH: 29= 40.90%    Treatments:  Seated STM to bilat UT's x 15 min  Chin tuck x 10  Tuck w/ rot x 10 ea  AA sh flexion on wall x 10  Wand ext x 10  Wand IR x 10  Pulleys x 10  UBE 2/2  Red band-Rows, bilat ext/ flex/IR/ER x 10 ea  Bicep curls x 10, #2  HP to R UT x 10 min EOS      Goals:  Active       PT Problem       Increase neck AROM to 30-35 degrees lateral flexion and L rotation to 60 degrees to improve neck mobility with ADLs.       Start:  03/21/24    Expected End:  06/03/24            Increase R shoulder AROM to at least 130 degrees- flexion, functional ER reach to T2 and functional IR reach to at least L1 and B " abduction to at least 120 degrees to improve reaching and dressing.       Start:  03/21/24    Expected End:  06/03/24            Increase B shoulder MMT by at least 1/3 grade to improve reaching and lifting.       Start:  03/21/24    Expected End:  06/03/24            Pt will report at least 75% decrease in pain to improve functional use of arm for ADLs and sleeping.       Start:  03/21/24    Expected End:  06/03/24            Improve Quick DASH by at least 15.91 points (MDIC).       Start:  03/21/24    Expected End:  06/03/24

## 2024-06-24 ENCOUNTER — TREATMENT (OUTPATIENT)
Dept: PHYSICAL THERAPY | Facility: CLINIC | Age: 79
End: 2024-06-24
Payer: MEDICARE

## 2024-06-24 DIAGNOSIS — M25.512 CHRONIC PAIN OF BOTH SHOULDERS: ICD-10-CM

## 2024-06-24 DIAGNOSIS — G89.29 CHRONIC PAIN OF BOTH SHOULDERS: ICD-10-CM

## 2024-06-24 DIAGNOSIS — M25.511 CHRONIC PAIN OF BOTH SHOULDERS: ICD-10-CM

## 2024-06-24 DIAGNOSIS — S46.001S ROTATOR CUFF INJURY, RIGHT, SEQUELA: ICD-10-CM

## 2024-06-24 PROCEDURE — 97110 THERAPEUTIC EXERCISES: CPT | Mod: GP,CQ

## 2024-06-24 NOTE — PROGRESS NOTES
"    Physical Therapy    Physical Therapy Treatment      Patient Name: Celeste Murray  MRN: [id number]  Today's Date: 6/24/2024     PT Therapeutic Procedures Time Entry  Therapeutic Exercise Time Entry: 30  Insurance : [employer]   Authorization : 4 visits Auth'd(5/28/24-6/24/24)  Visit #11(4/4)  (Re-eval 6/24/24)    Assessment:  Pt was able to complete rx w/o rest periods, indicating strength gains    Plan:  Cont per ins.      1` DX M25.511  Current Problem:   1. Rotator cuff injury, right, sequela  Follow Up In Physical Therapy      2. Chronic pain of both shoulders  Follow Up In Physical Therapy            Subjective    \"I am feeling better, I can move the R arm better for driving.  I'd like to try to get ore viists.\"    0/10 pain R shoulder, neck. 2` meds      Objective   Re-eval      Neck AROM (degrees):   Flexion- 40  Extension- 60  Lateral flexion- R- 25; L- 30  Rotation- R- 60; L- 50     Shoulder AROM (R/L in degrees):   Flexion- 145/ 145  Abduction- 125/ 150  Functional ER reach- T3/ T3  Functional IR reach- T12/ T10    Shoulder MMT (R/L):   Flexion- 4+/5; 5/5  Abduction- 4+/5; 4+/5  ER- 4/5; 4+/5  IR- 4+/5; 4+/5  Biceps- 4+/5; 4+/5  Triceps- 4+/5; 4+/5     Outcome Measures:  Quick DASH: 30= 43.18%                          Treatments:  Chin tuck x 10  Tuck w/ rot x 10 ea  AA sh flexion on wall  2x10  Wand ext x 10  Wand IR x 10  Pulleys x 20  UBE 2/2  Red band-Rows, bilat ext/ flex/IR/ER x 10 ea  Bicep curls x 10, #2      Goals:  Active       PT Problem       Increase neck AROM to 30-35 degrees lateral flexion and L rotation to 60 degrees to improve neck mobility with ADLs.       Start:  03/21/24    Expected End:  06/03/24            Increase R shoulder AROM to at least 130 degrees- flexion, functional ER reach to T2 and functional IR reach to at least L1 and B abduction to at least 120 degrees to improve reaching and dressing.       Start:  03/21/24    Expected End:  06/03/24            Increase B shoulder " MMT by at least 1/3 grade to improve reaching and lifting.       Start:  03/21/24    Expected End:  06/03/24            Pt will report at least 75% decrease in pain to improve functional use of arm for ADLs and sleeping.       Start:  03/21/24    Expected End:  06/03/24            Improve Quick DASH by at least 15.91 points (MDIC).       Start:  03/21/24    Expected End:  06/03/24

## 2024-06-25 ENCOUNTER — APPOINTMENT (OUTPATIENT)
Dept: PHYSICAL THERAPY | Facility: CLINIC | Age: 79
End: 2024-06-25
Payer: MEDICARE

## 2024-07-02 ENCOUNTER — TREATMENT (OUTPATIENT)
Dept: PHYSICAL THERAPY | Facility: CLINIC | Age: 79
End: 2024-07-02
Payer: MEDICARE

## 2024-07-02 DIAGNOSIS — M25.511 CHRONIC PAIN OF BOTH SHOULDERS: ICD-10-CM

## 2024-07-02 DIAGNOSIS — M25.512 CHRONIC PAIN OF BOTH SHOULDERS: ICD-10-CM

## 2024-07-02 DIAGNOSIS — S46.001S ROTATOR CUFF INJURY, RIGHT, SEQUELA: ICD-10-CM

## 2024-07-02 DIAGNOSIS — G89.29 CHRONIC PAIN OF BOTH SHOULDERS: ICD-10-CM

## 2024-07-02 PROCEDURE — 97140 MANUAL THERAPY 1/> REGIONS: CPT | Mod: GP,CQ

## 2024-07-02 PROCEDURE — 97110 THERAPEUTIC EXERCISES: CPT | Mod: GP,CQ

## 2024-07-02 NOTE — PROGRESS NOTES
"  Physical Therapy    Physical Therapy Treatment      Patient Name: Celeste Murray  MRN: [id number]  Today's Date: 7/2/2024     PT Therapeutic Procedures Time Entry  Manual Therapy Time Entry: 10  Therapeutic Exercise Time Entry: 30  Insurance : [employer]   Authorization : 5 visits Auth'd(7/2/24-8/30/24)  Visit #12(1/5)  (Re-eval 6/24/24)    Assessment:  Pt had decreased tolerance to MT today, and struggled w/ band technique today    Plan:  Cont 4 more visits      1` DX M25.511  Current Problem:   1. Rotator cuff injury, right, sequela  Follow Up In Physical Therapy      2. Chronic pain of both shoulders  Follow Up In Physical Therapy              Subjective    \"I am so happy that I am feeling better than when I first started..\"    3/10 pain R UT area      Objective         Neck AROM (degrees):   Flexion- 40  Extension- 60  Lateral flexion- R- 25; L- 30  Rotation- R- 60; L- 50     Shoulder AROM (R/L in degrees):   Flexion- 145/ 145  Abduction- 125/ 150  Functional ER reach- T3/ T3  Functional IR reach- T12/ T10    Shoulder MMT (R/L):   Flexion- 4+/5; 5/5  Abduction- 4+/5; 4+/5  ER- 4/5; 4+/5  IR- 4+/5; 4+/5  Biceps- 4+/5; 4+/5  Triceps- 4+/5; 4+/5     Outcome Measures:  Quick DASH: 30= 43.18%                          Treatments:  HP to c-spine prior to rx  Seated STM to c-spine, levator x 10 min  AA wall wash x 20  Wand ext x 20  Tuck w/ rot x 10 ea  Wand ext x 10  Towel IR stretch x 10  Red band Bilat ER x 20  Pulleys x 20  UBE 2/2  Red band-Rows x 20 ea      Goals:  Active       PT Problem       Increase neck AROM to 30-35 degrees lateral flexion and L rotation to 60 degrees to improve neck mobility with ADLs.       Start:  03/21/24    Expected End:  06/03/24            Increase R shoulder AROM to at least 130 degrees- flexion, functional ER reach to T2 and functional IR reach to at least L1 and B abduction to at least 120 degrees to improve reaching and dressing.       Start:  03/21/24    Expected End:  06/03/24  "           Increase B shoulder MMT by at least 1/3 grade to improve reaching and lifting.       Start:  03/21/24    Expected End:  06/03/24            Pt will report at least 75% decrease in pain to improve functional use of arm for ADLs and sleeping.       Start:  03/21/24    Expected End:  06/03/24            Improve Quick DASH by at least 15.91 points (MDIC).       Start:  03/21/24    Expected End:  06/03/24

## 2024-07-03 ENCOUNTER — APPOINTMENT (OUTPATIENT)
Dept: PRIMARY CARE | Facility: CLINIC | Age: 79
End: 2024-07-03

## 2024-07-03 ENCOUNTER — APPOINTMENT (OUTPATIENT)
Dept: RADIOLOGY | Facility: HOSPITAL | Age: 79
End: 2024-07-03
Payer: MEDICARE

## 2024-07-03 ENCOUNTER — HOSPITAL ENCOUNTER (EMERGENCY)
Facility: HOSPITAL | Age: 79
Discharge: HOME | End: 2024-07-03
Attending: EMERGENCY MEDICINE
Payer: MEDICARE

## 2024-07-03 VITALS
HEIGHT: 62 IN | HEART RATE: 82 BPM | BODY MASS INDEX: 42.69 KG/M2 | SYSTOLIC BLOOD PRESSURE: 162 MMHG | WEIGHT: 232 LBS | OXYGEN SATURATION: 98 % | DIASTOLIC BLOOD PRESSURE: 84 MMHG | RESPIRATION RATE: 18 BRPM | TEMPERATURE: 99.6 F

## 2024-07-03 DIAGNOSIS — R10.9 ABDOMINAL PAIN, UNSPECIFIED ABDOMINAL LOCATION: ICD-10-CM

## 2024-07-03 DIAGNOSIS — R04.0 EPISTAXIS: Primary | ICD-10-CM

## 2024-07-03 LAB
ALBUMIN SERPL BCP-MCNC: 4 G/DL (ref 3.4–5)
ALP SERPL-CCNC: 72 U/L (ref 33–136)
ALT SERPL W P-5'-P-CCNC: 27 U/L (ref 7–45)
ANION GAP BLDV CALCULATED.4IONS-SCNC: 10 MMOL/L (ref 10–25)
ANION GAP SERPL CALC-SCNC: 12 MMOL/L (ref 10–20)
APPEARANCE UR: CLEAR
APTT PPP: 28 SECONDS (ref 27–38)
AST SERPL W P-5'-P-CCNC: 21 U/L (ref 9–39)
BASE EXCESS BLDV CALC-SCNC: 2 MMOL/L (ref -2–3)
BASOPHILS # BLD AUTO: 0.03 X10*3/UL (ref 0–0.1)
BASOPHILS NFR BLD AUTO: 0.5 %
BILIRUB SERPL-MCNC: 0.2 MG/DL (ref 0–1.2)
BILIRUB UR STRIP.AUTO-MCNC: NEGATIVE MG/DL
BODY TEMPERATURE: 37 DEGREES CELSIUS
BUN SERPL-MCNC: 23 MG/DL (ref 6–23)
CA-I BLDV-SCNC: 1.17 MMOL/L (ref 1.1–1.33)
CALCIUM SERPL-MCNC: 9.4 MG/DL (ref 8.6–10.6)
CHLORIDE BLDV-SCNC: 105 MMOL/L (ref 98–107)
CHLORIDE SERPL-SCNC: 106 MMOL/L (ref 98–107)
CO2 SERPL-SCNC: 27 MMOL/L (ref 21–32)
COLOR UR: ABNORMAL
CREAT SERPL-MCNC: 0.93 MG/DL (ref 0.5–1.05)
EGFRCR SERPLBLD CKD-EPI 2021: 63 ML/MIN/1.73M*2
EOSINOPHIL # BLD AUTO: 0.11 X10*3/UL (ref 0–0.4)
EOSINOPHIL NFR BLD AUTO: 1.7 %
ERYTHROCYTE [DISTWIDTH] IN BLOOD BY AUTOMATED COUNT: 15.4 % (ref 11.5–14.5)
GLUCOSE BLDV-MCNC: 102 MG/DL (ref 74–99)
GLUCOSE SERPL-MCNC: 99 MG/DL (ref 74–99)
GLUCOSE UR STRIP.AUTO-MCNC: NORMAL MG/DL
HCO3 BLDV-SCNC: 26.5 MMOL/L (ref 22–26)
HCT VFR BLD AUTO: 36.5 % (ref 36–46)
HCT VFR BLD EST: 38 % (ref 36–46)
HGB BLD-MCNC: 12.1 G/DL (ref 12–16)
HGB BLDV-MCNC: 12.8 G/DL (ref 12–16)
IMM GRANULOCYTES # BLD AUTO: 0.01 X10*3/UL (ref 0–0.5)
IMM GRANULOCYTES NFR BLD AUTO: 0.2 % (ref 0–0.9)
INR PPP: 1 (ref 0.9–1.1)
KETONES UR STRIP.AUTO-MCNC: NEGATIVE MG/DL
LACTATE BLDV-SCNC: 1.6 MMOL/L (ref 0.4–2)
LEUKOCYTE ESTERASE UR QL STRIP.AUTO: NEGATIVE
LYMPHOCYTES # BLD AUTO: 1.92 X10*3/UL (ref 0.8–3)
LYMPHOCYTES NFR BLD AUTO: 29.4 %
MCH RBC QN AUTO: 30.5 PG (ref 26–34)
MCHC RBC AUTO-ENTMCNC: 33.2 G/DL (ref 32–36)
MCV RBC AUTO: 92 FL (ref 80–100)
MONOCYTES # BLD AUTO: 0.55 X10*3/UL (ref 0.05–0.8)
MONOCYTES NFR BLD AUTO: 8.4 %
NEUTROPHILS # BLD AUTO: 3.9 X10*3/UL (ref 1.6–5.5)
NEUTROPHILS NFR BLD AUTO: 59.8 %
NITRITE UR QL STRIP.AUTO: NEGATIVE
NRBC BLD-RTO: 0 /100 WBCS (ref 0–0)
OXYHGB MFR BLDV: 74.1 % (ref 45–75)
PCO2 BLDV: 40 MM HG (ref 41–51)
PH BLDV: 7.43 PH (ref 7.33–7.43)
PH UR STRIP.AUTO: 7 [PH]
PLATELET # BLD AUTO: 279 X10*3/UL (ref 150–450)
PO2 BLDV: 43 MM HG (ref 35–45)
POTASSIUM BLDV-SCNC: 4.3 MMOL/L (ref 3.5–5.3)
POTASSIUM SERPL-SCNC: 4.2 MMOL/L (ref 3.5–5.3)
PROT SERPL-MCNC: 6.7 G/DL (ref 6.4–8.2)
PROT UR STRIP.AUTO-MCNC: NEGATIVE MG/DL
PROTHROMBIN TIME: 11.2 SECONDS (ref 9.8–12.8)
RBC # BLD AUTO: 3.97 X10*6/UL (ref 4–5.2)
RBC # UR STRIP.AUTO: ABNORMAL /UL
RBC #/AREA URNS AUTO: NORMAL /HPF
SAO2 % BLDV: 76 % (ref 45–75)
SODIUM BLDV-SCNC: 137 MMOL/L (ref 136–145)
SODIUM SERPL-SCNC: 141 MMOL/L (ref 136–145)
SP GR UR STRIP.AUTO: 1.01
UROBILINOGEN UR STRIP.AUTO-MCNC: NORMAL MG/DL
WBC # BLD AUTO: 6.5 X10*3/UL (ref 4.4–11.3)
WBC #/AREA URNS AUTO: NORMAL /HPF

## 2024-07-03 PROCEDURE — 2500000001 HC RX 250 WO HCPCS SELF ADMINISTERED DRUGS (ALT 637 FOR MEDICARE OP): Mod: SE

## 2024-07-03 PROCEDURE — 84132 ASSAY OF SERUM POTASSIUM: CPT

## 2024-07-03 PROCEDURE — 99284 EMERGENCY DEPT VISIT MOD MDM: CPT | Mod: 25

## 2024-07-03 PROCEDURE — 74176 CT ABD & PELVIS W/O CONTRAST: CPT | Performed by: SURGERY

## 2024-07-03 PROCEDURE — 81001 URINALYSIS AUTO W/SCOPE: CPT

## 2024-07-03 PROCEDURE — 85025 COMPLETE CBC W/AUTO DIFF WBC: CPT

## 2024-07-03 PROCEDURE — 99284 EMERGENCY DEPT VISIT MOD MDM: CPT | Performed by: EMERGENCY MEDICINE

## 2024-07-03 PROCEDURE — 74176 CT ABD & PELVIS W/O CONTRAST: CPT

## 2024-07-03 PROCEDURE — 36415 COLL VENOUS BLD VENIPUNCTURE: CPT

## 2024-07-03 PROCEDURE — 84132 ASSAY OF SERUM POTASSIUM: CPT | Mod: 59

## 2024-07-03 PROCEDURE — 85730 THROMBOPLASTIN TIME PARTIAL: CPT

## 2024-07-03 RX ORDER — OXYMETAZOLINE HCL 0.05 %
2 SPRAY, NON-AEROSOL (ML) NASAL EVERY 12 HOURS PRN
Status: DISCONTINUED | OUTPATIENT
Start: 2024-07-03 | End: 2024-07-03 | Stop reason: HOSPADM

## 2024-07-03 ASSESSMENT — LIFESTYLE VARIABLES
EVER HAD A DRINK FIRST THING IN THE MORNING TO STEADY YOUR NERVES TO GET RID OF A HANGOVER: NO
EVER FELT BAD OR GUILTY ABOUT YOUR DRINKING: NO
HAVE YOU EVER FELT YOU SHOULD CUT DOWN ON YOUR DRINKING: NO
HAVE PEOPLE ANNOYED YOU BY CRITICIZING YOUR DRINKING: NO
TOTAL SCORE: 0

## 2024-07-03 ASSESSMENT — PAIN SCALES - GENERAL
PAINLEVEL_OUTOF10: 0 - NO PAIN

## 2024-07-03 ASSESSMENT — COLUMBIA-SUICIDE SEVERITY RATING SCALE - C-SSRS
1. IN THE PAST MONTH, HAVE YOU WISHED YOU WERE DEAD OR WISHED YOU COULD GO TO SLEEP AND NOT WAKE UP?: NO
2. HAVE YOU ACTUALLY HAD ANY THOUGHTS OF KILLING YOURSELF?: NO
6. HAVE YOU EVER DONE ANYTHING, STARTED TO DO ANYTHING, OR PREPARED TO DO ANYTHING TO END YOUR LIFE?: NO

## 2024-07-03 ASSESSMENT — PAIN - FUNCTIONAL ASSESSMENT: PAIN_FUNCTIONAL_ASSESSMENT: 0-10

## 2024-07-03 NOTE — ED TRIAGE NOTES
Pt arrived via EMS C/O nosebleed. Pt reports waking this morning and feeling like she had blood in her throat and nose began bleeding but was able to stop bleeding. Several hrs later,nose resumed bleeding and unable to get it to stop. Pt arrived with nose clamp in place, bleeding controlled. A/O x4, VSS at time of triage.

## 2024-07-03 NOTE — ED PROVIDER NOTES
Emergency Department Provider Note        History of Present Illness     History provided by: Patient, Family Member, and Nursing Staff  Limitations to History: None  External Records Reviewed with Brief Summary: Outpatient progress note from 2024 which showed cardiology visit for shortness of breath    HPI:  Celeste Murray is a 79 y.o. female with PMHx of HTN and obesity who is presenting with a nosebleed.  Patient reports that has been going on for about 2 hours.  Notes that she thinks she ate glass last night and been having abdominal pain for it.  She notes that she has been spitting up blood since the nosebleed started.  Denies blood thinners.  She notes feeling weak.  She also notes a tingling in her sinus region on the left side of her face.  No weakness no numbness.    Physical Exam   Triage vitals:  T 37.6 °C (99.6 °F)  HR 89  /90  RR 16  O2 100 % None (Room air)    Physical Exam  Constitutional:       General: She is not in acute distress.  HENT:      Head: Normocephalic.      Nose:      Comments: Nasal bleeding.  Clots removed.  No active posterior bleeding.  Cardiovascular:      Rate and Rhythm: Normal rate.   Pulmonary:      Effort: Pulmonary effort is normal. No respiratory distress.   Abdominal:      General: Abdomen is flat. There is no distension.      Tenderness: There is abdominal tenderness. There is no guarding or rebound.   Musculoskeletal:         General: Normal range of motion.   Skin:     General: Skin is dry.   Neurological:      Mental Status: She is alert and oriented to person, place, and time. Mental status is at baseline.        Medical Decision Making & ED Course   Medical Decision Makin y.o. female who is presenting today with epistaxis and abdominal pain.  Will obtain a CT abdomen and pelvis from the abdomen given the pain.  Will also obtain lab work send CBC CMP to assess  for thrombocytopenia or anemia. Afrin was applied and pressure applied. Epistaxis  stopped after pressure. Patient was observed for an hour afterwards. Patient was given strict return precautions regarding repeat epistaxis. Labs were obtained which showed no thrombocytopenia or anemia. CT abdomen and pelvis did not show signs of a foreign body or perforation. Patient was discharged home in stable condition. Given strict return precautions to come back if additional bleeding or abdominal tenderness.   ----      Differential diagnoses considered include but are not limited to: epistaxis     Social Determinants of Health which Significantly Impact Care:  poor health literacy  The following actions were taken to address these social determinants: spent extensive time explaining the diagnosis and potential etiology with patient and family    EKG Independent Interpretation: EKG not obtained    Independent Result Review and Interpretation: Relevant laboratory and radiographic results were reviewed and independently interpreted by myself.  As necessary, they are commented on in the ED Course.    Chronic conditions affecting the patient's care: As documented above in [facility]    The patient was discussed with the following consultants/services: None    Care Considerations: As documented above in [facility]    ED Course:  ED Course as of 07/04/24 0946   Wed Jul 03, 2024   1650 PLATELETS (10*3/UL) IN BLOOD AUTOMATED COUNT, Somali: 279 [REBECCA]   1650 HEMOGLOBIN: 12.1 [REBECCA]   1907 CT scan showed a hypodense lesion in the liver will require outpatient US [REBECCA]   2037 Patient's bleeding was controlled on reevaluation.  Patient was discharged home with sinus precautions.  Advised to return if bleeding worsens or returns.  Patient was given abdominal return precautions [REBECCA]      ED Course User Index  [REBECCA] Deena Norris MD         Diagnoses as of 07/04/24 0946   Epistaxis   Abdominal pain, unspecified abdominal location     Disposition   As a result of the work-up, the patient was discharged home.  she was informed of her  diagnosis and instructed to come back with any concerns or worsening of condition.  she and was agreeable to the plan as discussed above.  she was given the opportunity to ask questions.  All of the patient's questions were answered.    Procedures   Procedures    Patient seen and discussed with ED attending physician.    Deena Norris MD  Emergency Medicine     Deena Norris MD  Resident  07/04/24 0967

## 2024-07-04 LAB — HOLD SPECIMEN: NORMAL

## 2024-07-04 NOTE — DISCHARGE INSTRUCTIONS
Please return to the nosebleeding recurs.  Please apply pressure prior to arrival.  Please, if having worsening abdominal pain.  Return today ED for any new or worsening symptoms

## 2024-07-05 ENCOUNTER — APPOINTMENT (OUTPATIENT)
Dept: ENDOCRINOLOGY | Facility: CLINIC | Age: 79
End: 2024-07-05
Payer: MEDICARE

## 2024-07-08 ENCOUNTER — TELEMEDICINE (OUTPATIENT)
Dept: PRIMARY CARE | Facility: CLINIC | Age: 79
End: 2024-07-08
Payer: MEDICARE

## 2024-07-08 DIAGNOSIS — R07.0 THROAT PAIN: ICD-10-CM

## 2024-07-08 DIAGNOSIS — K76.89 LIVER CYST: Primary | ICD-10-CM

## 2024-07-08 PROCEDURE — 99442 PR PHYS/QHP TELEPHONE EVALUATION 11-20 MIN: CPT | Performed by: FAMILY MEDICINE

## 2024-07-08 NOTE — PROGRESS NOTES
Subjective   Patient ID: Celeste Murray is a 79 y.o. who presents for ER follow-up  HPI    ER visit  - On 7/3 ate some cookies, throat started hurting, then she developed nose bleed. She is concerned she ingested glass at that time  - Immediately prior she had URI symptoms that then resolved   - Went to ER, nosebleed was stopped. Had reassuring labs and CT abdomen/pelvis. Incidental liver calcification and cyst noted   - No further pain in abdomen  - Continues to have sore throat. She is very concerned about this. No fevers, chills. Able to eat and drink. No change in voice   - No nausea or vomiting  - Normal bowel movements  - No further nose bleeds      Review of Systems  10 point review of systems negative except as noted in HPI.    Objective     There were no vitals taken for this visit.  No acute distress. Speaking in full sentences without respiratory distress. Remainder of exam deferred      Assessment/Plan   78 yo seen via phone visit for ER follow-up    Nose bleed- resolved, suspect 2/2 allergic rhinitis  Sore throat- Persistent. No concerning features via phone, discussed monitoring and seeing me in 2 weeks for follow-up. She requests ENT referral, but does agree to follow-up with me as well  Liver lesion on CT- Recommend ultrasound     RTC 2 weeks or sooner as needed

## 2024-07-09 ENCOUNTER — LAB (OUTPATIENT)
Dept: LAB | Facility: LAB | Age: 79
End: 2024-07-09
Payer: MEDICARE

## 2024-07-09 ENCOUNTER — APPOINTMENT (OUTPATIENT)
Dept: ENDOCRINOLOGY | Facility: CLINIC | Age: 79
End: 2024-07-09
Payer: MEDICARE

## 2024-07-09 VITALS
BODY MASS INDEX: 43.06 KG/M2 | WEIGHT: 234 LBS | HEART RATE: 78 BPM | SYSTOLIC BLOOD PRESSURE: 153 MMHG | HEIGHT: 62 IN | TEMPERATURE: 98 F | DIASTOLIC BLOOD PRESSURE: 87 MMHG

## 2024-07-09 DIAGNOSIS — R94.6 ABNORMAL THYROID FUNCTION TEST: ICD-10-CM

## 2024-07-09 DIAGNOSIS — E11.65 TYPE 2 DIABETES MELLITUS WITH HYPERGLYCEMIA, WITHOUT LONG-TERM CURRENT USE OF INSULIN (MULTI): Primary | ICD-10-CM

## 2024-07-09 DIAGNOSIS — E04.1 THYROID NODULE: ICD-10-CM

## 2024-07-09 DIAGNOSIS — E11.65 TYPE 2 DIABETES MELLITUS WITH HYPERGLYCEMIA, WITHOUT LONG-TERM CURRENT USE OF INSULIN (MULTI): ICD-10-CM

## 2024-07-09 LAB
ALBUMIN SERPL BCP-MCNC: 4.2 G/DL (ref 3.4–5)
ANION GAP SERPL CALC-SCNC: 11 MMOL/L (ref 10–20)
BUN SERPL-MCNC: 23 MG/DL (ref 6–23)
CALCIUM SERPL-MCNC: 9.7 MG/DL (ref 8.6–10.6)
CHLORIDE SERPL-SCNC: 103 MMOL/L (ref 98–107)
CO2 SERPL-SCNC: 30 MMOL/L (ref 21–32)
CREAT SERPL-MCNC: 0.9 MG/DL (ref 0.5–1.05)
CREAT UR-MCNC: 155 MG/DL (ref 20–320)
EGFRCR SERPLBLD CKD-EPI 2021: 66 ML/MIN/1.73M*2
EST. AVERAGE GLUCOSE BLD GHB EST-MCNC: 134 MG/DL
GLUCOSE SERPL-MCNC: 97 MG/DL (ref 74–99)
HBA1C MFR BLD: 6.3 %
MICROALBUMIN UR-MCNC: 8.6 MG/L
MICROALBUMIN/CREAT UR: 5.5 UG/MG CREAT
PHOSPHATE SERPL-MCNC: 2.9 MG/DL (ref 2.5–4.9)
POTASSIUM SERPL-SCNC: 4.7 MMOL/L (ref 3.5–5.3)
SODIUM SERPL-SCNC: 139 MMOL/L (ref 136–145)
T4 FREE SERPL-MCNC: 1.07 NG/DL (ref 0.78–1.48)
TSH SERPL-ACNC: 0.44 MIU/L (ref 0.44–3.98)

## 2024-07-09 PROCEDURE — 1159F MED LIST DOCD IN RCRD: CPT | Performed by: STUDENT IN AN ORGANIZED HEALTH CARE EDUCATION/TRAINING PROGRAM

## 2024-07-09 PROCEDURE — 82570 ASSAY OF URINE CREATININE: CPT

## 2024-07-09 PROCEDURE — 99214 OFFICE O/P EST MOD 30 MIN: CPT | Performed by: STUDENT IN AN ORGANIZED HEALTH CARE EDUCATION/TRAINING PROGRAM

## 2024-07-09 PROCEDURE — 83036 HEMOGLOBIN GLYCOSYLATED A1C: CPT

## 2024-07-09 PROCEDURE — 3077F SYST BP >= 140 MM HG: CPT | Performed by: STUDENT IN AN ORGANIZED HEALTH CARE EDUCATION/TRAINING PROGRAM

## 2024-07-09 PROCEDURE — 80069 RENAL FUNCTION PANEL: CPT

## 2024-07-09 PROCEDURE — 3079F DIAST BP 80-89 MM HG: CPT | Performed by: STUDENT IN AN ORGANIZED HEALTH CARE EDUCATION/TRAINING PROGRAM

## 2024-07-09 PROCEDURE — 36415 COLL VENOUS BLD VENIPUNCTURE: CPT

## 2024-07-09 PROCEDURE — 1036F TOBACCO NON-USER: CPT | Performed by: STUDENT IN AN ORGANIZED HEALTH CARE EDUCATION/TRAINING PROGRAM

## 2024-07-09 PROCEDURE — 84439 ASSAY OF FREE THYROXINE: CPT

## 2024-07-09 PROCEDURE — 84443 ASSAY THYROID STIM HORMONE: CPT

## 2024-07-09 PROCEDURE — 82043 UR ALBUMIN QUANTITATIVE: CPT

## 2024-07-09 RX ORDER — LANCETS 26 GAUGE
EACH MISCELLANEOUS
Qty: 1 EACH | Refills: 0 | Status: SHIPPED | OUTPATIENT
Start: 2024-07-09 | End: 2025-07-09

## 2024-07-09 NOTE — PATIENT INSTRUCTIONS
Thyroid ultrasound for September    Get your labs done now    Based on your labs I will let you know if you need to start the once weekly injections    Follow up with the nutritionist    Follow up in 6 months      Maryjo Samano MD  Divison of Endocrinology   [facility]   Phone: [phone]    option 4, then option 1  Fax: [phone]

## 2024-07-09 NOTE — PROGRESS NOTES
78F PMH: MNG, s/p fall with right rotator cuff injury, HTN, Obesity, CKD     Following up regarding her thyroid and DM2, previously seen Dr. Forrest     1) Known history of MNG dating as far back as 2014 2018 had an FNA of 2 left nodules, benign  2019 1.2cm complex nodule on the right, FNA benign follicular nodule     Last ultrasound was done in 7/2023  Showing stability in nodules compared to 2022   They did mention a left 1A cervical LN    2) Osteopenia   DXA in 2023 showing osteopenia left femur and spine but FRAX sore not elevated     3) DM2   Lab Results   Component Value Date    HGBA1C 6.8 (A) 02/05/2024     Newly diagnosed in 2024  Not on meds  We sent for nutritionist referral     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    Hyperopia of both eyes with astigmatism and presbyopia    Other conditions influencing health status     Arthritis    Personal history of other diseases of the circulatory system     History of cardiac murmur    Personal history of other endocrine, nutritional and metabolic disease     History of thyroid disease    Pure hypercholesterolemia, unspecified     High cholesterol     Family History   Problem Relation Name Age of Onset    Diabetes Sister      Diabetes Brother      Diabetes Sibling       Social History     Socioeconomic History    Marital status:      Spouse name: Not on file    Number of children: Not on file    Years of education: Not on file    Highest education level: Not on file   Occupational History    Not on file   Tobacco Use    Smoking status: Never     Passive exposure: Past    Smokeless tobacco: Never   Substance and Sexual Activity    Alcohol use: Not Currently    Drug use: Never    Sexual activity: Not on file   Other Topics Concern    Not on file   Social History  Narrative    Not on file     Social Determinants of Health     Financial Resource Strain: Not on file   Food Insecurity: Unknown (2/6/2024)    Received from [facility]    Hunger Vital Sign     Worried About Running Out of Food in the Last Year: Never true     Ran Out of Food in the Last Year: Not on file   Transportation Needs: Not on file   Physical Activity: Not on file   Stress: Not on file   Social Connections: Not on file   Intimate Partner Violence: Not on file   Housing Stability: Not on file     ROS reviewed and is negative except for pertinent findings noted on HPI    Physical Exam  Constitutional:       Appearance: Normal appearance.   Neck:      Comments: Goiter, nodular gland   Abdominal:      Comments: Abdominal obesity   Musculoskeletal:      Comments: Walks with walker   Skin:     General: Skin is warm and dry.   Neurological:      General: No focal deficit present.      Mental Status: She is alert and oriented to person, place, and time.       labs and imaging reviewed, pertinent findings listed on HPI and Impression    Problem List Items Addressed This Visit       Thyroid nodule    Relevant Orders    US thyroid    Type 2 diabetes mellitus with hyperglycemia, without long-term current use of insulin (Multi) - Primary    Relevant Medications    Autolet (OneTouch Delica Plus Lanc Dev) lancing device    Other Relevant Orders    Albumin-Creatinine Ratio, Urine Random    Hemoglobin A1C    Renal Function Panel    Abnormal thyroid function test    Relevant Orders    Thyroid Stimulating Hormone    Thyroxine, Free    US thyroid     MNG   Was told by previous Endo this needs to be followed up, will have her do repeat ultrasound later this year.  She had biopsy proven benign thyroid nodule so less suspicious about the cervical LN   -can repeat ultrasound end of this year and if stable can space out surveillance     Osteopenia  repeat DEXA in 12/2025  Vitamin D 5000 units daily       DM2  Repeat labs now,  if A1c still high will start with GLP1 to also help with weight loss.  No contraindications to this and discussed risks and s/e  -diabetes screening labs now       Follow up in 3-4months

## 2024-07-10 ENCOUNTER — HOSPITAL ENCOUNTER (OUTPATIENT)
Dept: RADIOLOGY | Facility: HOSPITAL | Age: 79
Discharge: HOME | End: 2024-07-10
Payer: MEDICARE

## 2024-07-10 DIAGNOSIS — K76.89 LIVER CYST: ICD-10-CM

## 2024-07-10 PROCEDURE — 76705 ECHO EXAM OF ABDOMEN: CPT | Performed by: STUDENT IN AN ORGANIZED HEALTH CARE EDUCATION/TRAINING PROGRAM

## 2024-07-10 PROCEDURE — 76705 ECHO EXAM OF ABDOMEN: CPT

## 2024-07-15 ENCOUNTER — HOSPITAL ENCOUNTER (OUTPATIENT)
Dept: RADIOLOGY | Facility: HOSPITAL | Age: 79
Discharge: HOME | End: 2024-07-15
Payer: MEDICARE

## 2024-07-15 DIAGNOSIS — R94.6 ABNORMAL THYROID FUNCTION TEST: ICD-10-CM

## 2024-07-15 DIAGNOSIS — E04.1 THYROID NODULE: ICD-10-CM

## 2024-07-15 PROCEDURE — 76536 US EXAM OF HEAD AND NECK: CPT

## 2024-07-15 PROCEDURE — 76536 US EXAM OF HEAD AND NECK: CPT | Performed by: RADIOLOGY

## 2024-07-16 ENCOUNTER — TELEPHONE (OUTPATIENT)
Dept: ENDOCRINOLOGY | Facility: HOSPITAL | Age: 79
End: 2024-07-16
Payer: MEDICARE

## 2024-07-16 NOTE — TELEPHONE ENCOUNTER
----- Message from Maryjo Samano sent at 7/15/2024 10:46 PM EDT -----  Please let patient know that A1c is stable/similar compared to previous so if she does not want to start meds, she can continue with lifestyle mod    No protein in the urine, thyroid labs normal, kidney function and electrolytes look good

## 2024-07-22 ENCOUNTER — TREATMENT (OUTPATIENT)
Dept: PHYSICAL THERAPY | Facility: CLINIC | Age: 79
End: 2024-07-22
Payer: MEDICARE

## 2024-07-22 DIAGNOSIS — E04.1 THYROID NODULE: Primary | ICD-10-CM

## 2024-07-22 DIAGNOSIS — S46.001S ROTATOR CUFF INJURY, RIGHT, SEQUELA: ICD-10-CM

## 2024-07-22 DIAGNOSIS — M25.511 CHRONIC PAIN OF BOTH SHOULDERS: Primary | ICD-10-CM

## 2024-07-22 DIAGNOSIS — G89.29 CHRONIC PAIN OF BOTH SHOULDERS: Primary | ICD-10-CM

## 2024-07-22 DIAGNOSIS — M25.512 CHRONIC PAIN OF BOTH SHOULDERS: Primary | ICD-10-CM

## 2024-07-22 PROCEDURE — 97110 THERAPEUTIC EXERCISES: CPT | Mod: GP | Performed by: PHYSICAL THERAPIST

## 2024-07-22 PROCEDURE — 97140 MANUAL THERAPY 1/> REGIONS: CPT | Mod: GP | Performed by: PHYSICAL THERAPIST

## 2024-07-22 NOTE — PROGRESS NOTES
"  Physical Therapy    Physical Therapy Treatment      Patient Name: Celeste Murray  MRN: [id number]  Today's Date: 7/22/2024     PT Therapeutic Procedures Time Entry  Manual Therapy Time Entry: 10  Therapeutic Exercise Time Entry: 30  Insurance : [employer]   Authorization : 5 visits Auth'd (7/2/24-8/30/24)  Visit #13 (2/5)  (Re-eval 6/24/24)    Assessment:  Minimal rest needed between exercises today indicating improved strength and endurance. VC's to avoid turning trunk during SNAGs.    Plan:  Cont 3 more visits  Add serratus press.    Problem List Items Addressed This Visit             ICD-10-CM    Chronic pain of both shoulders - Primary M25.511, G89.29, M25.512     Other Visit Diagnoses         Codes    Rotator cuff injury, right, sequela     S46.001S            Subjective    \"My R shoulder keeps locking but I'm able to use my arm to drive now. It's definitely a lot better.\"    0/10 pain      Objective   Functional IR reach to T7-8 bilat  Some limitation in c-spine rotation to L when compared bilat                          Treatments:  Modalities:  HP to c-spine prior to rx    Manual Therapy:  Supine STM to c-spine, levator x 10 min    Therapeutic Exercise:  Supine red band horiz abd/ ER x 10 ea  Red band rows x 20  AA wall wash x 20  Tuck w/ rot x 10 ea  Wand ext x 10  Towel SNAG for rotation x 10 w/ 10 sec hold L  Pulleys x 20  UBE 2/2    Goals:  Active       PT Problem       Increase neck AROM to 30-35 degrees lateral flexion and L rotation to 60 degrees to improve neck mobility with ADLs.       Start:  03/21/24    Expected End:  06/03/24            Increase R shoulder AROM to at least 130 degrees- flexion, functional ER reach to T2 and functional IR reach to at least L1 and B abduction to at least 120 degrees to improve reaching and dressing.       Start:  03/21/24    Expected End:  06/03/24            Increase B shoulder MMT by at least 1/3 grade to improve reaching and lifting.       Start:  03/21/24    " Expected End:  06/03/24            Pt will report at least 75% decrease in pain to improve functional use of arm for ADLs and sleeping.       Start:  03/21/24    Expected End:  06/03/24            Improve Quick DASH by at least 15.91 points (MDIC).       Start:  03/21/24    Expected End:  06/03/24

## 2024-07-24 ENCOUNTER — APPOINTMENT (OUTPATIENT)
Dept: OPHTHALMOLOGY | Facility: CLINIC | Age: 79
End: 2024-07-24
Payer: MEDICARE

## 2024-07-24 ENCOUNTER — OFFICE VISIT (OUTPATIENT)
Dept: PRIMARY CARE | Facility: CLINIC | Age: 79
End: 2024-07-24
Payer: MEDICARE

## 2024-07-24 VITALS
RESPIRATION RATE: 16 BRPM | DIASTOLIC BLOOD PRESSURE: 89 MMHG | OXYGEN SATURATION: 96 % | TEMPERATURE: 98 F | WEIGHT: 235 LBS | BODY MASS INDEX: 43.24 KG/M2 | HEART RATE: 69 BPM | HEIGHT: 62 IN | SYSTOLIC BLOOD PRESSURE: 131 MMHG

## 2024-07-24 DIAGNOSIS — J30.1 NON-SEASONAL ALLERGIC RHINITIS DUE TO POLLEN: ICD-10-CM

## 2024-07-24 DIAGNOSIS — Z00.00 MEDICARE ANNUAL WELLNESS VISIT, SUBSEQUENT: Primary | ICD-10-CM

## 2024-07-24 DIAGNOSIS — S16.1XXA STRAIN OF NECK MUSCLE, INITIAL ENCOUNTER: ICD-10-CM

## 2024-07-24 DIAGNOSIS — Z00.00 ROUTINE GENERAL MEDICAL EXAMINATION AT HEALTH CARE FACILITY: ICD-10-CM

## 2024-07-24 PROCEDURE — 1159F MED LIST DOCD IN RCRD: CPT | Performed by: FAMILY MEDICINE

## 2024-07-24 PROCEDURE — G0439 PPPS, SUBSEQ VISIT: HCPCS | Performed by: FAMILY MEDICINE

## 2024-07-24 PROCEDURE — 1170F FXNL STATUS ASSESSED: CPT | Performed by: FAMILY MEDICINE

## 2024-07-24 PROCEDURE — 1036F TOBACCO NON-USER: CPT | Performed by: FAMILY MEDICINE

## 2024-07-24 PROCEDURE — 3075F SYST BP GE 130 - 139MM HG: CPT | Performed by: FAMILY MEDICINE

## 2024-07-24 PROCEDURE — 1160F RVW MEDS BY RX/DR IN RCRD: CPT | Performed by: FAMILY MEDICINE

## 2024-07-24 PROCEDURE — 99215 OFFICE O/P EST HI 40 MIN: CPT | Performed by: FAMILY MEDICINE

## 2024-07-24 PROCEDURE — 3079F DIAST BP 80-89 MM HG: CPT | Performed by: FAMILY MEDICINE

## 2024-07-24 PROCEDURE — 1126F AMNT PAIN NOTED NONE PRSNT: CPT | Performed by: FAMILY MEDICINE

## 2024-07-24 RX ORDER — FLUTICASONE PROPIONATE 50 MCG
1 SPRAY, SUSPENSION (ML) NASAL
Qty: 16 G | Refills: 2 | Status: SHIPPED | OUTPATIENT
Start: 2024-07-24

## 2024-07-24 ASSESSMENT — ACTIVITIES OF DAILY LIVING (ADL)
MANAGING_FINANCES: INDEPENDENT
TAKING_MEDICATION: INDEPENDENT
GROCERY_SHOPPING: INDEPENDENT
DOING_HOUSEWORK: INDEPENDENT
BATHING: INDEPENDENT
DRESSING: INDEPENDENT

## 2024-07-24 ASSESSMENT — ENCOUNTER SYMPTOMS
OCCASIONAL FEELINGS OF UNSTEADINESS: 1
LOSS OF SENSATION IN FEET: 0
DEPRESSION: 1

## 2024-07-24 ASSESSMENT — PAIN SCALES - GENERAL: PAINLEVEL: 0-NO PAIN

## 2024-07-24 NOTE — PROGRESS NOTES
"Subjective   Patient ID: Celeste Murray is a 79 y.o. who presents for Medicare Annual Wellness Visit Subsequent.  HPI    Medicare wellness visit  - Items reviewed in [facility]  - Reviewed medical, surgery and family history  - Reviewed current providers  - Reviewed depression screen  - Reviewed functional ability screenings  - Feeling unsteady- stable, unchanged   - No current advanced care planning  - Reviewed preventive health screenings. Any needed updates will be outlined in assessment and plan     Throat irritation  - See prior visit for further irritation. Went to the ER for nose bleed, and she is stressed about what caused this. Thinks it was from hay fever or eating a cookie with glass in it. Continues to have intermittent throat irritation. No further nose bleeds      Chronic neck pain  - Working on exercises for shoulder, and they are working on neck as well  - No radicular pain  - Interested on dedicated PT for neck       HTN  - No headaches, chest pain, shortness of breath, vision changes, dizziness  - Taking losartan-hydrochlorothiazide without issue  - Blood pressure stable  - Creatinine stable    DM  - Follows with endocrine. A1C 6.3    Review of Systems  10 point review of systems negative except as noted in HPI.    Objective     /89   Pulse 69   Temp 36.7 °C (98 °F)   Resp 16   Ht 1.575 m (5' 2\")   Wt 107 kg (235 lb)   SpO2 96%   BMI 42.98 kg/m²   General: well appearing, no distress  HEENT: No pharyngeal erythema. No lymphadenopathy, no thyromegaly. Mild tenderness along cervical paraspinal muscles bilaterally   CV: Regular rate and rhythm, no murmur  Lungs: Clear to auscultation bilaterally  Extremities: No edema noted, wearing compression stockings   Psych: Appropriate mood and affect     Assessment/Plan   78 yo here for medicare wellness visit.     Medicare wellness visit  - Reviewed medicare questionaires and care gaps  - Declines diabetic foot exam at this time, will readdress " next visit  - Will readdress vaccines at next visit  - Up to date on age appropriate cancer screenings  - Gave patient information on advance directives     Hypertension  - Continue losartan-hydrochlorothiazide    Throat irritation  - Suspect 2/2 allergic rhinitis. Will start flonase    Chronic neck pain  - No red flag symptoms  - Recommend PT    RTC 3 months or sooner as needed

## 2024-07-30 ENCOUNTER — TREATMENT (OUTPATIENT)
Dept: PHYSICAL THERAPY | Facility: CLINIC | Age: 79
End: 2024-07-30
Payer: MEDICARE

## 2024-07-30 DIAGNOSIS — M25.512 CHRONIC PAIN OF BOTH SHOULDERS: Primary | ICD-10-CM

## 2024-07-30 DIAGNOSIS — M25.511 CHRONIC PAIN OF BOTH SHOULDERS: Primary | ICD-10-CM

## 2024-07-30 DIAGNOSIS — G89.29 CHRONIC PAIN OF BOTH SHOULDERS: Primary | ICD-10-CM

## 2024-07-30 DIAGNOSIS — S46.001S ROTATOR CUFF INJURY, RIGHT, SEQUELA: ICD-10-CM

## 2024-07-30 PROCEDURE — 97110 THERAPEUTIC EXERCISES: CPT | Mod: GP,CQ

## 2024-07-30 NOTE — PROGRESS NOTES
"  Physical Therapy    Physical Therapy Treatment      Patient Name: Celeste Murray  MRN: [id number]  Today's Date: 7/30/2024     PT Therapeutic Procedures Time Entry  Therapeutic Exercise Time Entry: 40  Insurance : [employer]   Authorization : 5 visits Auth'd (7/2/24-8/30/24)  Visit #14 (3/5)  (Re-eval 6/24/24)    Assessment:  Decreased tolerance to c-spine mobs today, decreased ability to progress.    Plan:  Cont 2 more visits  Add serratus press. YTB all planes    Problem List Items Addressed This Visit             ICD-10-CM    Chronic pain of both shoulders - Primary M25.511, G89.29, M25.512     Other Visit Diagnoses         Codes    Rotator cuff injury, right, sequela     S46.001S              Subjective    \"Less spasms.\"    0/10 pain      Objective   Functional IR reach to T7-8 bilat  Some limitation in c-spine rotation to L when compared bilat                          Treatments:  Modalities:  HP to c-spine EOS    Manual Therapy:  Seated Bilat UT mobs x 5 min    Therapeutic Exercise:  Supine red band horiz abd/ ER x 10 ea  Red band rows x 20  AA wall wash x 20  Tuck w/ rot x 10 ea  Wand ext x 10  Towel SNAG for rotation x 10 w/ 10 sec hold L  Pulleys x 20  UBE 2/2    Goals:  Active       PT Problem       Increase neck AROM to 30-35 degrees lateral flexion and L rotation to 60 degrees to improve neck mobility with ADLs.       Start:  03/21/24    Expected End:  06/03/24            Increase R shoulder AROM to at least 130 degrees- flexion, functional ER reach to T2 and functional IR reach to at least L1 and B abduction to at least 120 degrees to improve reaching and dressing.       Start:  03/21/24    Expected End:  06/03/24            Increase B shoulder MMT by at least 1/3 grade to improve reaching and lifting.       Start:  03/21/24    Expected End:  06/03/24            Pt will report at least 75% decrease in pain to improve functional use of arm for ADLs and sleeping.       Start:  03/21/24    Expected End:  " 06/03/24            Improve Quick DASH by at least 15.91 points (MDIC).       Start:  03/21/24    Expected End:  06/03/24

## 2024-08-05 ENCOUNTER — TREATMENT (OUTPATIENT)
Dept: PHYSICAL THERAPY | Facility: CLINIC | Age: 79
End: 2024-08-05
Payer: MEDICARE

## 2024-08-05 DIAGNOSIS — G89.29 CHRONIC PAIN OF BOTH SHOULDERS: ICD-10-CM

## 2024-08-05 DIAGNOSIS — S46.001S ROTATOR CUFF INJURY, RIGHT, SEQUELA: ICD-10-CM

## 2024-08-05 DIAGNOSIS — M25.512 CHRONIC PAIN OF BOTH SHOULDERS: ICD-10-CM

## 2024-08-05 DIAGNOSIS — M25.511 CHRONIC PAIN OF BOTH SHOULDERS: ICD-10-CM

## 2024-08-05 PROCEDURE — 97110 THERAPEUTIC EXERCISES: CPT | Mod: GP,CQ

## 2024-08-05 NOTE — PROGRESS NOTES
"  Physical Therapy    Physical Therapy Treatment      Patient Name: Celeste Murray  MRN: [id number]  Today's Date: 8/5/2024     PT Therapeutic Procedures Time Entry  Therapeutic Exercise Time Entry: 45  Insurance : [place] [employer]   Authorization : 5 visits Auth'd (7/2/24-8/30/24)  Visit #15 (4/5)  (Re-eval 6/24/24)    Assessment:  Pt was able to progress shoulder strengthening w/o difficulty.  Switched wand ext to wall extension isometric 2` feeling of fatigue w/ wand.    Plan:  Cont 1 more visits  Add serratus press.     Problem List Items Addressed This Visit             ICD-10-CM    Chronic pain of both shoulders M25.511, G89.29, M25.512     Other Visit Diagnoses         Codes    Rotator cuff injury, right, sequela     S46.001S              Subjective    \"R shoulder is feeling so much better.  I am grateful that you all helped me.  Did you get the requisition for my neck?\"    0/10 pain      Objective   Functional IR reach to T7-8 bilat  Some limitation in c-spine rotation to L when compared bilat                          Treatments:  Modalities:  HP to c-spine EOS        Therapeutic Exercise:  UBE 3/3  YTB rows, bilat ext, IR, ER, flex x 20 ea  AA wall wash x 20  Raises-flex, scap, abd x 10 ea, #1  SNAG rot x 10 ea  Sh ext against wall, bilat x 10  Pulleys x 20    Goals:  Active       PT Problem       Increase neck AROM to 30-35 degrees lateral flexion and L rotation to 60 degrees to improve neck mobility with ADLs.       Start:  03/21/24    Expected End:  06/03/24            Increase R shoulder AROM to at least 130 degrees- flexion, functional ER reach to T2 and functional IR reach to at least L1 and B abduction to at least 120 degrees to improve reaching and dressing.       Start:  03/21/24    Expected End:  06/03/24            Increase B shoulder MMT by at least 1/3 grade to improve reaching and lifting.       Start:  03/21/24    Expected End:  06/03/24            Pt will report at least 75% decrease in pain to " improve functional use of arm for ADLs and sleeping.       Start:  03/21/24    Expected End:  06/03/24            Improve Quick DASH by at least 15.91 points (MDIC).       Start:  03/21/24    Expected End:  06/03/24

## 2024-08-06 ENCOUNTER — HOSPITAL ENCOUNTER (OUTPATIENT)
Dept: CARDIOLOGY | Facility: HOSPITAL | Age: 79
Discharge: HOME | End: 2024-08-06
Payer: MEDICARE

## 2024-08-06 DIAGNOSIS — R06.02 SHORTNESS OF BREATH: ICD-10-CM

## 2024-08-06 DIAGNOSIS — R01.1 MURMUR, HEART: ICD-10-CM

## 2024-08-06 PROCEDURE — 2500000004 HC RX 250 GENERAL PHARMACY W/ HCPCS (ALT 636 FOR OP/ED): Performed by: INTERNAL MEDICINE

## 2024-08-06 PROCEDURE — 93306 TTE W/DOPPLER COMPLETE: CPT | Performed by: INTERNAL MEDICINE

## 2024-08-06 PROCEDURE — 93306 TTE W/DOPPLER COMPLETE: CPT

## 2024-08-07 LAB
AORTIC VALVE PEAK VELOCITY: 1.88 M/S
AV PEAK GRADIENT: 14.2 MMHG
AVA (PEAK VEL): 1.26 CM2
EJECTION FRACTION APICAL 4 CHAMBER: 46.3
EJECTION FRACTION: 55 %
LEFT ATRIUM VOLUME AREA LENGTH INDEX BSA: 26.9 ML/M2
LEFT VENTRICLE INTERNAL DIMENSION DIASTOLE: 5.15 CM (ref 3.5–6)
LEFT VENTRICULAR OUTFLOW TRACT DIAMETER: 1.81 CM
MITRAL VALVE E/A RATIO: 0.73
RIGHT VENTRICLE PEAK SYSTOLIC PRESSURE: 18.8 MMHG
TRICUSPID ANNULAR PLANE SYSTOLIC EXCURSION: 2.1 CM

## 2024-08-12 ENCOUNTER — TREATMENT (OUTPATIENT)
Dept: PHYSICAL THERAPY | Facility: CLINIC | Age: 79
End: 2024-08-12
Payer: MEDICARE

## 2024-08-12 DIAGNOSIS — M25.512 CHRONIC PAIN OF BOTH SHOULDERS: Primary | ICD-10-CM

## 2024-08-12 DIAGNOSIS — G89.29 CHRONIC PAIN OF BOTH SHOULDERS: Primary | ICD-10-CM

## 2024-08-12 DIAGNOSIS — M25.511 CHRONIC PAIN OF BOTH SHOULDERS: Primary | ICD-10-CM

## 2024-08-12 DIAGNOSIS — S46.001S ROTATOR CUFF INJURY, RIGHT, SEQUELA: ICD-10-CM

## 2024-08-12 PROCEDURE — 97110 THERAPEUTIC EXERCISES: CPT | Mod: GP | Performed by: PHYSICAL THERAPIST

## 2024-08-12 NOTE — PROGRESS NOTES
"  Physical Therapy    Physical Therapy Treatment      Patient Name: Celeste Murray  MRN: [id number]  Today's Date: 8/12/2024     PT Therapeutic Procedures Time Entry  Therapeutic Exercise Time Entry: 40  Insurance : [employer] [employer]   Authorization : 5 visits Auth'd (7/2/24-8/30/24)  Visit #16 (5/5)  (Re-eval 6/24/24)    Assessment:  Reassess shows good gains in shoulder ROM and strength since beginning PT. Quick DASH improved by 19 points/ 43.18%. Celeste has met or has shown progress towards all PT goals.    Plan:  Discharge to [facility]. Pt will schedule future appointment for evaluation of neck pain.      Problem List Items Addressed This Visit             ICD-10-CM    Chronic pain of both shoulders - Primary M25.511, G89.29, M25.512     Other Visit Diagnoses         Codes    Rotator cuff injury, right, sequela     S46.001S            Subjective    Pt reports feeling 80% improved since beginning PT. \"I think I'm doing pretty good. I'm able to do yardwork again which I love doing.\"    0/10 pain    Objective   Neck AROM (degrees):   Flexion- 40  Extension- 60  Lateral flexion- R- 25; L- 30  Rotation- R- 60; L- 50     Shoulder AROM (R/L in degrees):   Flexion- 155/ 155  Abduction- 140/ 150  Functional ER reach- T3/ T3  Functional IR reach- T7-8/ T7-8     Shoulder MMT (R/L):   Flexion- 4+/5; 5/5  Abduction- 4+/5; 4+/5  ER- 4+/5; 4+/5  IR- 4+/5; 4+/5  Biceps- 4+/5; 4+/5  Triceps- 4+/5; 4+/5     Outcome Measures:  Quick DASH: 25= 31.82%                          Treatments:  Therapeutic Exercise:  UBE 3/3  YTB rows, bilat ext, IR, ER, flex x 20 ea  AA wall wash x 20  Raises-flex, scap, abd x 10 ea, #1  SNAG rot x 10 ea  Sh ext against wall, bilat x 10  Pulleys x 20    Goals:  Active       PT Problem       Increase neck AROM to 30-35 degrees lateral flexion and L rotation to 60 degrees to improve neck mobility with ADLs.       Start:  03/21/24    Expected End:  06/03/24            Increase R shoulder AROM to at least 130 " degrees- flexion, functional ER reach to T2 and functional IR reach to at least L1 and B abduction to at least 120 degrees to improve reaching and dressing.       Start:  03/21/24    Expected End:  06/03/24            Increase B shoulder MMT by at least 1/3 grade to improve reaching and lifting.       Start:  03/21/24    Expected End:  06/03/24            Pt will report at least 75% decrease in pain to improve functional use of arm for ADLs and sleeping.       Start:  03/21/24    Expected End:  06/03/24            Improve Quick DASH by at least 15.91 points (MDIC).       Start:  03/21/24    Expected End:  06/03/24

## 2024-08-14 ENCOUNTER — APPOINTMENT (OUTPATIENT)
Dept: RADIOLOGY | Facility: HOSPITAL | Age: 79
End: 2024-08-14
Payer: MEDICARE

## 2024-08-22 ENCOUNTER — HOSPITAL ENCOUNTER (OUTPATIENT)
Dept: RADIOLOGY | Facility: HOSPITAL | Age: 79
Discharge: HOME | End: 2024-08-22
Payer: MEDICARE

## 2024-08-22 VITALS
OXYGEN SATURATION: 100 % | DIASTOLIC BLOOD PRESSURE: 70 MMHG | RESPIRATION RATE: 16 BRPM | SYSTOLIC BLOOD PRESSURE: 157 MMHG | TEMPERATURE: 97.9 F | HEART RATE: 66 BPM

## 2024-08-22 DIAGNOSIS — E04.1 THYROID NODULE: ICD-10-CM

## 2024-08-22 PROCEDURE — 10005 FNA BX W/US GDN 1ST LES: CPT

## 2024-08-22 PROCEDURE — 7100000010 HC PHASE TWO TIME - EACH INCREMENTAL 1 MINUTE

## 2024-08-22 PROCEDURE — 7100000009 HC PHASE TWO TIME - INITIAL BASE CHARGE

## 2024-08-22 ASSESSMENT — PAIN - FUNCTIONAL ASSESSMENT
PAIN_FUNCTIONAL_ASSESSMENT: 0-10

## 2024-08-22 ASSESSMENT — PAIN SCALES - GENERAL
PAINLEVEL_OUTOF10: 0 - NO PAIN

## 2024-08-22 NOTE — POST-PROCEDURE NOTE
Interventional Radiology Brief Postprocedure Note    Attending: Anderson Garcia MD    Assistant: Rajeev Banks MD    Diagnosis: thyroid nodules    Description of procedure:    Using ultrasound guidance a total of 4 passes were made with 25 gauge needle to the left thyroid nodule. Scanning after each pass demonstrated no bleeding. Patient tolerated the procedure. Please refer to the full radiology report for further details.      Anesthesia:  Local    Complications: None    Estimated Blood Loss: none    Medications (Filter: Administrations occurring from 1504 to 1522 on 08/22/24)      None            See detailed result report with images in PACS.    The patient tolerated the procedure well without incident or complication and is in stable condition.     Rajeev Banks MD  Diagnostic Radiology, PGY-5, R4    NON-Urgent on call weekends and after hours weekdays (5pm - 5am) IR pager: [phone]  Urgent & emergent on call weekends and after hours weekdays (5pm-7am) IR pager: [phone]

## 2024-08-22 NOTE — DISCHARGE INSTRUCTIONS
You received moderate sedation:  - Do not drive a car, or operate any machinery or power tools of any kind.  - Do not drink any alcoholic drinks.  - Do not take any over the counter medications that may cause drowsiness.  - Do not make any important decisions or sign any legal documents.  - You need to have a responsible adult accompany you home.  - You may resume your normal diet.  - We strongly suggest that a responsible adult be with you for the rest of the day and also during the night. This is for your protection and safety.     For questions related to your procedure:  Please call [phone] between the hours of 7:00am-5:00pm Monday through Friday.  Please call [phone] after 5:00pm and on weekends and holidays.     In the event of an emergency call 911 or go to your nearest emergency room.

## 2024-08-22 NOTE — PRE-PROCEDURE NOTE
INTERVENTIONAL RADIOLOGY PRE-PROCEDURE NOTE    Celeste Murray is a 79 y.o. female with PMHx of thyroid nodules who presents to the interventional radiology department for left thyroid nodule FNA/biopsy.    Procedure: left thyroid nodule FNA/biopsy    Indication for procedure: The encounter diagnosis was Thyroid nodule.    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    Hyperopia of both eyes with astigmatism and presbyopia    Other conditions influencing health status     Arthritis    Personal history of other diseases of the circulatory system     History of cardiac murmur    Personal history of other endocrine, nutritional and metabolic disease     History of thyroid disease    Pure hypercholesterolemia, unspecified     High cholesterol      Past Surgical History:   Procedure Laterality Date    MR HEAD ANGIO WO IV CONTRAST  12/27/2012    MR HEAD ANGIO WO IV CONTRAST 12/27/2012 [facility]    OTHER SURGICAL HISTORY  09/08/2014    History Of Prior Surgery    TONSILLECTOMY  08/10/2015    Tonsillectomy       Relevant Labs:   Lab Results   Component Value Date    CREATININE 0.90 07/09/2024    EGFR 66 07/09/2024    INR 1.0 07/03/2024    PROTIME 11.2 07/03/2024       Planned Sedation/Anesthesia: None    Directed physical examination:    General: Normal appearance, behavior, cognition and NAD  Lungs: No increased work of breathing      Current Outpatient Medications:     acetaminophen (Tylenol 8 Hour) 650 mg ER tablet, Take by mouth. Use as directed, Disp: , Rfl:     ascorbic acid (Vitamin C) 1,000 mg tablet, Take 1 tablet (1,000 mg) by mouth once daily., Disp: , Rfl:     aspirin 81 mg EC tablet, Take 1 tablet (81 mg) by mouth once daily. As directed, Disp: , Rfl:     Autolet (OneTouch Delica Plus Lanc Dev) lancing device,  For daily BG check, Disp: 1 each, Rfl: 0    blood sugar diagnostic (Blood Glucose Test) strip, For daily BG check, Disp: 100 strip, Rfl: 5    blood-glucose meter misc, For blood sugar checks, Disp: 1 each, Rfl: 0    calcium carbonate 600 mg calcium (1,500 mg) tablet, Take 1 tablet (600 mg) by mouth once daily., Disp: , Rfl:     cholecalciferol (Vitamin D3) 5,000 Units tablet, Take 1 tablet (5,000 Units) by mouth once daily., Disp: 90 tablet, Rfl: 3    cyclobenzaprine (Flexeril) 10 mg tablet, Take 1 tablet (10 mg) by mouth as needed at bedtime for muscle spasms., Disp: 60 tablet, Rfl: 0    fluticasone (Flonase) 50 mcg/actuation nasal spray, Administer 1 spray into each nostril once daily., Disp: 16 g, Rfl: 2    lancets (Lancets,Ultra Thin) misc, For daily BG checks, Disp: 100 each, Rfl: 2    losartan-hydrochlorothiazide (Hyzaar) 50-12.5 mg tablet, Take 1 tablet by mouth once daily., Disp: 90 tablet, Rfl: 2    multivitamin (One Daily Multivitamin) tablet, Take 1 tablet by mouth once daily., Disp: , Rfl:     zinc sulfate (Zincate) 220 (50 Zn) MG capsule, Take 1 capsule (50 mg of elemental zinc) by mouth once daily., Disp: , Rfl:      Mallampati: II (hard and soft palate, upper portion of tonsils anduvula visible)    ASA Score: ASA 2 - Patient with mild systemic disease with no functional limitations    Benefits, risks and alternatives of procedure and planned sedation have been discussed with the patient and/or their representative. All questions answered and they agree to proceed.     Rajeev Banks MD  Diagnostic Radiology, PGY-5, R4      NON-Urgent on call weekends and after hours weekdays (5pm - 5am) IR pager: [phone]  Urgent & emergent on call weekends and after hours weekdays (5pm-7am) IR pager: [phone]

## 2024-08-23 LAB
LABORATORY COMMENT REPORT: NORMAL
LABORATORY COMMENT REPORT: NORMAL
PATH REPORT.FINAL DX SPEC: NORMAL
PATH REPORT.GROSS SPEC: NORMAL
PATH REPORT.RELEVANT HX SPEC: NORMAL
PATH REPORT.TOTAL CANCER: NORMAL
RESIDENT REVIEW: NORMAL

## 2024-08-29 ENCOUNTER — TELEPHONE (OUTPATIENT)
Dept: ENDOCRINOLOGY | Facility: HOSPITAL | Age: 79
End: 2024-08-29
Payer: MEDICARE

## 2024-08-29 NOTE — TELEPHONE ENCOUNTER
----- Message from Maryjo Samano sent at 8/27/2024  9:30 PM EDT -----  Please let the patient know that thyroid biopsy results are benign

## 2024-09-03 ENCOUNTER — OFFICE VISIT (OUTPATIENT)
Dept: ORTHOPEDIC SURGERY | Facility: CLINIC | Age: 79
End: 2024-09-03
Payer: MEDICARE

## 2024-09-03 DIAGNOSIS — G89.29 CHRONIC RIGHT SHOULDER PAIN: ICD-10-CM

## 2024-09-03 DIAGNOSIS — M25.511 CHRONIC RIGHT SHOULDER PAIN: ICD-10-CM

## 2024-09-03 DIAGNOSIS — M19.011 ARTHRITIS OF RIGHT SHOULDER REGION: Primary | ICD-10-CM

## 2024-09-03 PROCEDURE — 1159F MED LIST DOCD IN RCRD: CPT | Performed by: STUDENT IN AN ORGANIZED HEALTH CARE EDUCATION/TRAINING PROGRAM

## 2024-09-03 PROCEDURE — 99212 OFFICE O/P EST SF 10 MIN: CPT | Performed by: STUDENT IN AN ORGANIZED HEALTH CARE EDUCATION/TRAINING PROGRAM

## 2024-09-03 PROCEDURE — 1036F TOBACCO NON-USER: CPT | Performed by: STUDENT IN AN ORGANIZED HEALTH CARE EDUCATION/TRAINING PROGRAM

## 2024-09-03 ASSESSMENT — PAIN - FUNCTIONAL ASSESSMENT: PAIN_FUNCTIONAL_ASSESSMENT: NO/DENIES PAIN

## 2024-09-03 NOTE — PROGRESS NOTES
CHIEF COMPLAINT:   Chief Complaint   Patient presents with    Right Shoulder - Injections     History: 79 y.o. female returns the office today for follow-up of her right shoulder.  She has known shoulder arthritis.  We gave her injection 3 months ago.  He is doing very well.  In fact, she really is not having much pain today.  Able to use the shoulder as tolerated.    6/4/24: presents to the office today for evaluation of her right shoulder.  The patient is right-hand dominant.  She is no longer working.  She states that she actually injured her shoulder at her previous job about a year and a half ago when she fell directly onto the right shoulder.  Since that time she has had extensive physical therapy.  She actually is in therapy again has not been in therapy for more than 3 months at this point.  Continues to have pain and mechanical symptoms of the right shoulder.  States that the shoulder locks sometimes.  Not had any injections.  Pain is really deep and anterior.  Also has some cervical complaints today.      Past medical history, past surgical history, medications, allergies, family history, social history, and review of systems were reviewed today.    A 12 point review of systems was negative other than as stated in the HPI.    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    Hyperopia of both eyes with astigmatism and presbyopia    Other conditions influencing health status     Arthritis    Personal history of other diseases of the circulatory system     History of cardiac murmur    Personal history of other endocrine, nutritional and metabolic disease     History of thyroid disease    Pure hypercholesterolemia, unspecified     High cholesterol        Allergies   Allergen Reactions    Iodinated Contrast Media  Anaphylaxis    Propoxyphene Unknown, Anaphylaxis and Other    Propoxyphene Hcl Anaphylaxis    Barium Sulfate Unknown and Other    Lisinopril Other and GI Upset     Gastrointestinal upset        Past Surgical History:   Procedure Laterality Date    MR HEAD ANGIO WO IV CONTRAST  12/27/2012    MR HEAD ANGIO WO IV CONTRAST 12/27/2012 [facility]    OTHER SURGICAL HISTORY  09/08/2014    History Of Prior Surgery    TONSILLECTOMY  08/10/2015    Tonsillectomy        Family History   Problem Relation Name Age of Onset    Diabetes Sister      Diabetes Brother      Diabetes Sibling          Social History     Socioeconomic History    Marital status:      Spouse name: Not on file    Number of children: Not on file    Years of education: Not on file    Highest education level: Not on file   Occupational History    Not on file   Tobacco Use    Smoking status: Never     Passive exposure: Past    Smokeless tobacco: Never   Substance and Sexual Activity    Alcohol use: Not Currently    Drug use: Never    Sexual activity: Not on file   Other Topics Concern    Not on file   Social History Narrative    Not on file     Social Determinants of Health     Financial Resource Strain: Not on file   Food Insecurity: Unknown (2/6/2024)    [facility]    Hunger Vital Sign     Worried About Running Out of Food in the Last Year: Never true     Ran Out of Food in the Last Year: Not on file   Transportation Needs: Not on file   Physical Activity: Not on file   Stress: Not on file   Social Connections: Not on file   Intimate Partner Violence: Not on file   Housing Stability: Not on file        CURRENT MEDICATIONS:   Current Outpatient Medications   Medication Sig Dispense Refill    acetaminophen (Tylenol 8 Hour) 650 mg ER tablet Take by mouth. Use as directed      ascorbic acid (Vitamin C) 1,000 mg tablet Take 1 tablet (1,000 mg) by mouth once daily.      aspirin 81 mg EC tablet Take 1 tablet (81  "mg) by mouth once daily. As directed      Autolet (OneTouch Delica Plus Lanc Dev) lancing device For daily BG check 1 each 0    blood sugar diagnostic (Blood Glucose Test) strip For daily BG check 100 strip 5    blood-glucose meter misc For blood sugar checks 1 each 0    calcium carbonate 600 mg calcium (1,500 mg) tablet Take 1 tablet (600 mg) by mouth once daily.      cholecalciferol (Vitamin D3) 5,000 Units tablet Take 1 tablet (5,000 Units) by mouth once daily. 90 tablet 3    cyclobenzaprine (Flexeril) 10 mg tablet Take 1 tablet (10 mg) by mouth as needed at bedtime for muscle spasms. 60 tablet 0    fluticasone (Flonase) 50 mcg/actuation nasal spray Administer 1 spray into each nostril once daily. 16 g 2    lancets (Lancets,Ultra Thin) misc For daily BG checks 100 each 2    losartan-hydrochlorothiazide (Hyzaar) 50-12.5 mg tablet Take 1 tablet by mouth once daily. 90 tablet 2    multivitamin (One Daily Multivitamin) tablet Take 1 tablet by mouth once daily.      zinc sulfate (Zincate) 220 (50 Zn) MG capsule Take 1 capsule (50 mg of elemental zinc) by mouth once daily.       No current facility-administered medications for this visit.       Physical Examination:      7/9/2024    11:15 AM 7/24/2024     1:29 PM 8/22/2024     1:54 PM 8/22/2024     3:00 PM 8/22/2024     3:10 PM 8/22/2024     3:20 PM 8/22/2024     3:40 PM   Vitals   Systolic 153 131 142 140 138 155 157   Diastolic 87 89 77 65 70 69 70   Heart Rate 78 69 70 60 67 65 66   Temp 36.7 °C (98 °F) 36.7 °C (98 °F) 36.6 °C (97.9 °F)       Resp  16 17 17 15 15 16   Height (in) 1.575 m (5' 2\") 1.575 m (5' 2\")        Weight (lb) 234 235        BMI 42.8 kg/m2 42.98 kg/m2        BSA (m2) 2.15 m2 2.16 m2        Visit Report Report Report           There is no height or weight on file to calculate BMI.    Well-appearing, appears stated age, pleasant and cooperative, appropriate mood and behavior. Height and weight reviewed. Alert and oriented x3.  Auditory function " intact.  No acute distress.  Intact ocular function, LILIYA, EOMI. Breathing is unlabored .  There is no evidence of jugular venous distension. Skin appearance is normal without evidence of rash or other lesions. 2+ radial pulses bilaterally, fingers pink and wwp, good capillary refill, no pitting edema. No appreciable lymphadenopathy in bilateral upper extremities. SILT throughout both upper extremities, median/radial/ulnar/musculocutaneous/axillary nerve motor and sensory intact (except for abnormalities noted in focused musculoskeletal exam section below).     Neck exam: Full range of motion of the neck in flexion/extension and rotational movements. No significant areas of tenderness to palpation in the neck.    On exam of bilateral upper extremities, she has she has relatively preserved range of motion of the right shoulder in forward flexion, active forward flexion 130 compared to 140 on the left, external rotation though is limited, 10 degrees on the right compared to 30 on the left.  Internal rotation to the side on the right compared to L5 on the left.  Rotator cuff strength is preserved but painful on the right side.    Imaging: Radiographs of the right shoulder were reviewed today.  First interpreted by myself.  Findings consistent with glenohumeral arthritis with loss of glenohumeral joint space and osteophyte formation.  Humeral head is centered on the axillary view.    Assessment: Right shoulder osteoarthritis    Plan: Patient's symptoms, exam and imaging findings are consistent with end stage glenohumeral osteoarthritis.  We discussed the natural history of this.  We discussed that with shoulder osteoarthritis, there is loss of cartilage on both the humerus and glenoid, bony erosion of the glenoid, and extra bone formation called osteophytes on the humerus and glenoid.  We discussed that there are different patterns of arthritis.  Conservative treatment for glenohumeral arthritis is limited, and can  include activity modification, injections and physical therapy, although with end stage bone on bone arthritis the benefits of physical therapy may be limited. The final option would be to have a shoulder replacement. After discussion, the patient responded well to the cortisone injection 3 months ago.  At this point I will let her use the shoulder as tolerated.  Will have her come back in 2 months to see how she is doing, if she wants another injection at that time we can do it..          Dragon software was used to dictate this note, please be aware that minor errors in transcription may be present.    Glen Karimi MD    Shoulder/Elbow Surgery  [facility]

## 2024-09-19 ENCOUNTER — OFFICE VISIT (OUTPATIENT)
Dept: CARDIOLOGY | Facility: HOSPITAL | Age: 79
End: 2024-09-19
Payer: MEDICARE

## 2024-09-19 VITALS
HEIGHT: 62 IN | DIASTOLIC BLOOD PRESSURE: 76 MMHG | BODY MASS INDEX: 44.94 KG/M2 | SYSTOLIC BLOOD PRESSURE: 165 MMHG | OXYGEN SATURATION: 96 % | HEART RATE: 64 BPM | WEIGHT: 244.2 LBS

## 2024-09-19 DIAGNOSIS — R06.02 SHORTNESS OF BREATH: ICD-10-CM

## 2024-09-19 DIAGNOSIS — I10 HYPERTENSION, UNSPECIFIED TYPE: Primary | ICD-10-CM

## 2024-09-19 DIAGNOSIS — E66.01 CLASS 3 SEVERE OBESITY WITHOUT SERIOUS COMORBIDITY WITH BODY MASS INDEX (BMI) OF 40.0 TO 44.9 IN ADULT, UNSPECIFIED OBESITY TYPE: ICD-10-CM

## 2024-09-19 PROCEDURE — 1036F TOBACCO NON-USER: CPT | Performed by: INTERNAL MEDICINE

## 2024-09-19 PROCEDURE — 3077F SYST BP >= 140 MM HG: CPT | Performed by: INTERNAL MEDICINE

## 2024-09-19 PROCEDURE — 1159F MED LIST DOCD IN RCRD: CPT | Performed by: INTERNAL MEDICINE

## 2024-09-19 PROCEDURE — 99214 OFFICE O/P EST MOD 30 MIN: CPT | Performed by: INTERNAL MEDICINE

## 2024-09-19 PROCEDURE — 3078F DIAST BP <80 MM HG: CPT | Performed by: INTERNAL MEDICINE

## 2024-09-19 PROCEDURE — 1126F AMNT PAIN NOTED NONE PRSNT: CPT | Performed by: INTERNAL MEDICINE

## 2024-09-19 ASSESSMENT — PAIN SCALES - GENERAL: PAINLEVEL: 0-NO PAIN

## 2024-09-19 NOTE — PROGRESS NOTES
Subjective   Celeste Murray is a 79 y.o. female.    Past medical history  Former smoker  Hypertension on losartan hydrochlorothiazide combination  Obesity and BMI of 42  Echocardiogram in September 2022 low normal LVEF with some regional wall motion abnormality      Social history:Lives alone, works part-time 5 days of every 2 weeks, uses walker to walk     No drug or alcohol abuse history       Chief Complaint:  FUV (3 month follow up)    HPI  No change in symptoms.  Still complains of shortness of breath with exertion.  No resting dyspnea.  No orthopnea.  No lower extremity edema.  No palpitation on 8 arrhythmic symptoms.  No chest pain.  Compliant with the medication  Attempted to lose weight but in fact gained 4 pounds  Still refusing to get a cardiac MRI or contrast CT      ROS  No major complaints    Objective   Physical Exam    Constitutional:       Appearance: Not in distress.   Neck:      Vascular: JVD normal.   Pulmonary:      Effort: Pulmonary effort is normal.      Breath sounds: Normal breath sounds.   Cardiovascular:      PMI at left midclavicular line. Normal rate. Regular rhythm. Normal S1. Normal S2.       Murmurs: There is a systolic murmur.   Edema:     Peripheral edema absent.   Abdominal:      General: Bowel sounds are normal.      Palpations: Abdomen is soft.   Skin:     General: Skin is warm and dry.   Neurological:      General: No focal deficit present.      Mental Status: Alert and oriented to person, place and time.   Lab Review:   Lab Results   Component Value Date     07/09/2024    K 4.7 07/09/2024     07/09/2024    CO2 30 07/09/2024    BUN 23 07/09/2024    CREATININE 0.90 07/09/2024    GLUCOSE 97 07/09/2024    CALCIUM 9.7 07/09/2024     Lab Results   Component Value Date    WBC 6.5 07/03/2024    HGB 12.1 07/03/2024    HCT 36.5 07/03/2024    MCV 92 07/03/2024     07/03/2024     Lab Results   Component Value Date    CHOL 196 02/12/2024    TRIG 86 02/12/2024    HDL 68.0  02/12/2024    LDLDIRECT 84 06/04/2021 September 2022 transthoracic echo  CONCLUSIONS:   1. Left ventricular systolic function is low normal with a 50-55% estimated ejection fraction.   2. Spectral Doppler shows an impaired relaxation pattern of left ventricular diastolic filling.   3. There is mild to moderate hypokinesis of the basal and mid inferoposterior wall.   4. Mild mitral valve regurgitation.   5. The estimated PASP is 31 mmHg.    TTE Aug 2024  CONCLUSIONS:   1. Poorly visualized anatomical structures due to suboptimal image quality.   2. Left ventricular ejection fraction is low normal, by visual estimate at 55%.   3. There is normal right ventricular global systolic function.  Reviewed myself most likely murmur is related to hypercontractility of the LV specifically in the apical at the mid cavitary area causing some cavity obliteration.    Assessment/Plan   Patient is here for follow-up visit for dyspnea on exertion. Known case of obesity and hypertension     Shortness of breath:  -Most likely noncardiac because of obesity and back pain along with deconditioning  -No signs of volume overload  -Murmur is likely because of LV cavity obliteration.  No significant valvular heart issues  -No change in treatment plan  -Patient do not want the contrast CT, ordering a calcium score to see if patient has very elevated coronary artery calcium score.    Hypertension: No change in therapy.  Patient has not taken today her medication at this time blood pressure in the clinic is high    Obesity  Advised to continue working on her weight loss    Follow-up in 6 months

## 2024-09-25 ENCOUNTER — APPOINTMENT (OUTPATIENT)
Dept: PHYSICAL THERAPY | Facility: CLINIC | Age: 79
End: 2024-09-25
Payer: MEDICARE

## 2024-10-02 ENCOUNTER — TELEPHONE (OUTPATIENT)
Dept: OTOLARYNGOLOGY | Facility: HOSPITAL | Age: 79
End: 2024-10-02
Payer: MEDICARE

## 2024-10-02 NOTE — TELEPHONE ENCOUNTER
Unable to reach patient by phone to reschedule the appointment from 10-16-24.  The appointment has been rescheduled now to 10-23-24.  (Reminder notice mailed to patient's home)  NICHO Hung

## 2024-10-03 DIAGNOSIS — I10 UNCONTROLLED HYPERTENSION: ICD-10-CM

## 2024-10-04 ENCOUNTER — EVALUATION (OUTPATIENT)
Dept: PHYSICAL THERAPY | Facility: CLINIC | Age: 79
End: 2024-10-04
Payer: MEDICARE

## 2024-10-04 DIAGNOSIS — S16.1XXD STRAIN OF NECK MUSCLE, SUBSEQUENT ENCOUNTER: ICD-10-CM

## 2024-10-04 DIAGNOSIS — M54.2 CHRONIC NECK PAIN: Primary | ICD-10-CM

## 2024-10-04 DIAGNOSIS — G89.29 CHRONIC NECK PAIN: Primary | ICD-10-CM

## 2024-10-04 PROCEDURE — 97110 THERAPEUTIC EXERCISES: CPT | Mod: GP | Performed by: PHYSICAL THERAPIST

## 2024-10-04 PROCEDURE — 97162 PT EVAL MOD COMPLEX 30 MIN: CPT | Mod: GP | Performed by: PHYSICAL THERAPIST

## 2024-10-04 RX ORDER — LOSARTAN POTASSIUM AND HYDROCHLOROTHIAZIDE 12.5; 5 MG/1; MG/1
1 TABLET ORAL DAILY
Qty: 90 TABLET | Refills: 1 | Status: SHIPPED | OUTPATIENT
Start: 2024-10-04

## 2024-10-04 ASSESSMENT — ENCOUNTER SYMPTOMS
DEPRESSION: 0
OCCASIONAL FEELINGS OF UNSTEADINESS: 1
LOSS OF SENSATION IN FEET: 0

## 2024-10-04 NOTE — PROGRESS NOTES
Physical Therapy    Physical Therapy Evaluation and Treatment      Patient Name: Celeste Murray  MRN: [id number]  Today's Date: 10/4/2024  Time Entry:   Time Calculation  Start Time: 1040  Stop Time: 1115  Time Calculation (min): 35 min  PT Evaluation Time Entry  PT Evaluation (Moderate) Time Entry: 25  PT Therapeutic Procedures Time Entry  Therapeutic Exercise Time Entry: 10     Insurance: [place] [employer]age and [facility] Secondary  Needs authorization  Visit #1    Assessment:  Celeste is a 79 y.o. female presenting with chronic neck pain with mobility deficits and possible cervicogenic HA component. Exam shows painful and restricted neck AROM, decreased strength in scapular stabilizers and postural impairments. Skilled PT is medically necessary to address these impairments and reduce pain to improve ADLs.    Plan:  OP PT Plan  Treatment/Interventions: Cryotherapy, Dry needling, Education/ Instruction, Hot pack, Manual therapy, Therapeutic exercises  PT Plan: Skilled PT  PT Frequency: 1 time per week  Duration: 8-10 weeks  Onset Date: 01/27/23  Certification Period Start Date: 10/04/24  Certification Period End Date: 01/02/25  Number of Treatments Authorized: Needs auth  Rehab Potential: Good  Plan of Care Agreement: Patient      Problem List Items Addressed This Visit             ICD-10-CM    Chronic neck pain - Primary M54.2, G89.29    Relevant Orders    Follow Up In Physical Therapy     Other Visit Diagnoses         Codes    Strain of neck muscle, subsequent encounter     S16.1XXD    Relevant Orders    Follow Up In Physical Therapy          General:  Reason for Referral: Chronic neck pain  Referred By: Dr. Cecilia Becerra  Preferred Learning Style: verbal, visual, written    Subjective    Celeste c/o chronic posterior neck pain R>L with radiating into B upper trapezius ongoing since she fell on 1/27/23 when she slipped on ice and fell flat on her back.  She denies any radiating or n/t into upper  "extremities. She describes sharp pain and spasms with turning her head and lifting heavy objects. She feels she lacks ROM and flexibility. She also reports occasional occipital and temporal lobe HAs “when I exert myself.\" Pain is better with rest, ice pack, heating pad, Motrin and Tylenol. PMHx: diabetes, HTN, OA, thyroid disorder     Pt Goals: “Be able to use my head and neck more with less pain. Less stiffness when I turn.”    Precautions:  Precautions  STEADI Fall Risk Score (The score of 4 or more indicates an increased risk of falling): 6    Pain:  7/10 currently, 7/10 at worst, dull pressure, sharp with movement, spasms    Prior Level of Function:  Independent in ADLs with use of rollator for amb    Objective   Observation: presents amb with rollator; postural assessment shows forward head, rounded shoulders, scapular protraction     Palpation: TTP throughout midline c-spine, cervicothoracic junction, R>L upper trapezius and paraspinals and suboccipitals    Neck AROM (degrees):   Flexion- 40 *painful  Extension- 50  Lateral flexion- R- 30; L- 20 *painful  Rotation- R- 60; L- 50 *painful    Shoulder AROM: slight restriction in R elevation vs L    Shoulder MMT (R/L):   Flexion- 4+/5; 4+/5  Abduction- 4+/5; 4+/5  ER- 4/5; 4+/5  IR- 4+/5; 4+/5  Serratus anterior- 4-/5; 4-/5    Outcome Measures:  NDI: 26 = 52%    Treatments:  Therapeutic Exercise:   Seated chin tucks x10 w/ 3 sec hold  Seated chin tuck w/ rotation x10 w/ 3 sec hold ea way  Seated scap retractions x10 w/ 3 sec hold  Seated shoulder rolls x10    EDUCATION:  Access Code: [email or web address]  URL: [email or web address]  Date: 10/04/2024  Prepared by: Ramy Ivan    Exercises  - Seated Cervical Retraction  - 2 x daily - 7 x weekly - 1 sets - 10 reps - 3 seconds hold  - Seated Cervical Retraction and Rotation  - 2 x daily - 7 x weekly - 1 sets - 10 reps - 3 seconds hold  - Seated Scapular Retraction  - 2 x daily - 7 x weekly - 1 sets - 10 " reps - 3 seconds hold  - Seated Shoulder Rolls  - 2 x daily - 7 x weekly - 1 sets - 10 reps    Goals:  Active       PT Problem       PT Goal 1       Start:  10/04/24    Expected End:  12/18/24       Increase neck AROM by 5-10 degrees in flexion and extension and L lateral flexion to 30 degrees and L rotation to 60 degrees to improve looking up/down and turning head to look over shoulder.         PT Goal 2       Start:  10/04/24    Expected End:  12/18/24       Increase B scapular stabilizer MMT by at least 1/3 grade to reduce stress on cervicothoracic spine.         PT Goal 3       Start:  10/04/24    Expected End:  12/18/24       Pt will demonstrate improved postural awareness with decreased forward head and improved scapular retraction while sitting and standing to reduce stress on neck.         PT Goal 4       Start:  10/04/24    Expected End:  12/18/24       Pt will report at least 75% decrease in neck pain to improve turning head and lifting objects.         PT Goal 5       Start:  10/04/24    Expected End:  12/18/24       Improve NDI score by at least 10% (MDIC).

## 2024-10-07 ENCOUNTER — TREATMENT (OUTPATIENT)
Dept: PHYSICAL THERAPY | Facility: CLINIC | Age: 79
End: 2024-10-07
Payer: MEDICARE

## 2024-10-07 DIAGNOSIS — M54.2 CHRONIC NECK PAIN: ICD-10-CM

## 2024-10-07 DIAGNOSIS — G89.29 CHRONIC NECK PAIN: ICD-10-CM

## 2024-10-07 DIAGNOSIS — S16.1XXD STRAIN OF NECK MUSCLE, SUBSEQUENT ENCOUNTER: ICD-10-CM

## 2024-10-07 PROCEDURE — 97110 THERAPEUTIC EXERCISES: CPT | Mod: GP,CQ

## 2024-10-07 NOTE — PROGRESS NOTES
"Physical Therapy    Physical Therapy Evaluation and Treatment      Patient Name: Celeste Murray  MRN: [id number]  Today's Date: 10/7/2024  Time Entry:   Time Calculation  Start Time: 0255  Stop Time: 0335  Time Calculation (min): 40 min     PT Therapeutic Procedures Time Entry  Therapeutic Exercise Time Entry: 30     Insurance: [place] [employer] and [facility] Secondary  Needs authorization  Visit #2    Assessment:  Pt was able to decrease UT pain w/ STM/ex today.  Pt needed some cueing to decrease intensity to manage sx's, but was able to gently progress    Plan:   RTB Bilat ext, ER      Problem List Items Addressed This Visit             ICD-10-CM    Chronic neck pain M54.2, G89.29     Other Visit Diagnoses         Codes    Strain of neck muscle, subsequent encounter     S16.1XXD            Subjective    R UT sore, 7/10      Pain:  7/10 currently, Sub occ. To R UT. 6/10 EOS        Objective       Neck AROM (degrees):   Flexion- 40 *painful  Extension- 50  Lateral flexion- R- 30; L- 20 *painful  Rotation- R- 60; L- 50 *painful    Shoulder AROM: slight restriction in R elevation vs L    Shoulder MMT (R/L):   Flexion- 4+/5; 4+/5  Abduction- 4+/5; 4+/5  ER- 4/5; 4+/5  IR- 4+/5; 4+/5  Serratus anterior- 4-/5; 4-/5    Outcome Measures:  NDI: 26 = 52%    Treatments:  HP to c-spine prior to rx, seated  STM in seated to sub occ., To T3, R UT   Therapeutic Exercise:   Seated chin tucks x10 w/ 3 sec hold  Seated chin tuck w/ rotation x10 w/ 3 sec hold ea way  Seated SB x 10 ea, 3\"  Seated shoulder rolls x 10, BWD  Upper cervical extension over towel x 10  AA Wall wash x 10  Red band rows x 10    EDUCATION:  Access Code: [email or web address]  URL: [email or web address]  Date: 10/04/2024  Prepared by: Ramy Ivan    Exercises  - Seated Cervical Retraction  - 2 x daily - 7 x weekly - 1 sets - 10 reps - 3 seconds hold  - Seated Cervical Retraction and Rotation  - 2 x daily - 7 x weekly - 1 sets - 10 reps " - 3 seconds hold  - Seated Scapular Retraction  - 2 x daily - 7 x weekly - 1 sets - 10 reps - 3 seconds hold  - Seated Shoulder Rolls  - 2 x daily - 7 x weekly - 1 sets - 10 reps    Goals:  R>L shoulder pain Problems       R>L shoulder pain Problems (Resolved)       PT Problem       Increase neck AROM to 30-35 degrees lateral flexion and L rotation to 60 degrees to improve neck mobility with ADLs. (Adequate for Discharge)       Start:  03/21/24    Expected End:  06/03/24    Resolved:  08/12/24         Increase R shoulder AROM to at least 130 degrees- flexion, functional ER reach to T2 and functional IR reach to at least L1 and B abduction to at least 120 degrees to improve reaching and dressing. (Met)       Start:  03/21/24    Expected End:  06/03/24    Resolved:  08/12/24         Increase B shoulder MMT by at least 1/3 grade to improve reaching and lifting. (Met)       Start:  03/21/24    Expected End:  06/03/24    Resolved:  08/12/24         Pt will report at least 75% decrease in pain to improve functional use of arm for ADLs and sleeping. (Met)       Start:  03/21/24    Expected End:  06/03/24    Resolved:  08/12/24         Improve Quick DASH by at least 15.91 points (MDIC). (Met)       Start:  03/21/24    Expected End:  06/03/24    Resolved:  08/12/24              Neck pain Problems       Neck pain Problems (Active)       PT Problem       PT Goal 1       Start:  10/04/24    Expected End:  12/18/24       Increase neck AROM by 5-10 degrees in flexion and extension and L lateral flexion to 30 degrees and L rotation to 60 degrees to improve looking up/down and turning head to look over shoulder.         PT Goal 2       Start:  10/04/24    Expected End:  12/18/24       Increase B scapular stabilizer MMT by at least 1/3 grade to reduce stress on cervicothoracic spine.         PT Goal 3       Start:  10/04/24    Expected End:  12/18/24       Pt will demonstrate improved postural awareness with decreased forward head  and improved scapular retraction while sitting and standing to reduce stress on neck.         PT Goal 4       Start:  10/04/24    Expected End:  12/18/24       Pt will report at least 75% decrease in neck pain to improve turning head and lifting objects.         PT Goal 5       Start:  10/04/24    Expected End:  12/18/24       Improve NDI score by at least 10% (MDIC).

## 2024-10-16 ENCOUNTER — APPOINTMENT (OUTPATIENT)
Dept: OTOLARYNGOLOGY | Facility: HOSPITAL | Age: 79
End: 2024-10-16
Payer: MEDICARE

## 2024-10-18 ENCOUNTER — TREATMENT (OUTPATIENT)
Dept: PHYSICAL THERAPY | Facility: CLINIC | Age: 79
End: 2024-10-18
Payer: MEDICARE

## 2024-10-18 DIAGNOSIS — M25.511 PAIN IN RIGHT SHOULDER: ICD-10-CM

## 2024-10-18 PROCEDURE — 97110 THERAPEUTIC EXERCISES: CPT | Mod: GP,CQ

## 2024-10-18 NOTE — PROGRESS NOTES
"Physical Therapy    Physical Therapy Evaluation and Treatment      Patient Name: Celeste Murray  MRN: [id number]  Today's Date: 10/18/2024  Time Entry:   Time Calculation  Start Time: 1145  Stop Time: 1230  Time Calculation (min): 45 min     PT Therapeutic Procedures Time Entry  Therapeutic Exercise Time Entry: 40     Insurance: [place] [employer] and [facility]  Needs authorization  Visit #3    Assessment:  Pt was able to gently progress w/ slight decrease in pain after gentle STM to UT's EOS    Plan:   Theraband all planes      Problem List Items Addressed This Visit    None  Visit Diagnoses         Codes    Pain in right shoulder     M25.511            Subjective    \"Spine is stiff from top to bottom today.\"      Pain:    Objective   Neck AROM (degrees):   Flexion- 40 *painful  Extension- 50  Lateral flexion- R- 30; L- 20 *painful  Rotation- R- 60; L- 50 *painful    Shoulder AROM: slight restriction in R elevation vs L    Shoulder MMT (R/L):   Flexion- 4+/5; 4+/5  Abduction- 4+/5; 4+/5  ER- 4/5; 4+/5  IR- 4+/5; 4+/5  Serratus anterior- 4-/5; 4-/5    Outcome Measures:  NDI: 26 = 52%    Treatments:  HP to c-spine prior to rx, seated  Therapeutic Exercise:   Nu Step x 5 min(HP) no Ue's  Red abnd rows, bilat exr, bilat ER  Pball overhead x 20  Pball serratus x 20  Chin tuck x 10  Tuck w/ rot x 10 ea  Upper cervical ext over towel x 20  Seated SB attempted, stopped 2` spasming    EDUCATION:  Access Code: [email or web address]  URL: [email or web address]  Date: 10/04/2024  Prepared by: Ramy Ivan    Exercises  - Seated Cervical Retraction  - 2 x daily - 7 x weekly - 1 sets - 10 reps - 3 seconds hold  - Seated Cervical Retraction and Rotation  - 2 x daily - 7 x weekly - 1 sets - 10 reps - 3 seconds hold  - Seated Scapular Retraction  - 2 x daily - 7 x weekly - 1 sets - 10 reps - 3 seconds hold  - Seated Shoulder Rolls  - 2 x daily - 7 x weekly - 1 sets - 10 reps    Goals:  R>L shoulder " pain Problems       R>L shoulder pain Problems (Resolved)       PT Problem       Increase neck AROM to 30-35 degrees lateral flexion and L rotation to 60 degrees to improve neck mobility with ADLs. (Adequate for Discharge)       Start:  03/21/24    Expected End:  06/03/24    Resolved:  08/12/24         Increase R shoulder AROM to at least 130 degrees- flexion, functional ER reach to T2 and functional IR reach to at least L1 and B abduction to at least 120 degrees to improve reaching and dressing. (Met)       Start:  03/21/24    Expected End:  06/03/24    Resolved:  08/12/24         Increase B shoulder MMT by at least 1/3 grade to improve reaching and lifting. (Met)       Start:  03/21/24    Expected End:  06/03/24    Resolved:  08/12/24         Pt will report at least 75% decrease in pain to improve functional use of arm for ADLs and sleeping. (Met)       Start:  03/21/24    Expected End:  06/03/24    Resolved:  08/12/24         Improve Quick DASH by at least 15.91 points (MDIC). (Met)       Start:  03/21/24    Expected End:  06/03/24    Resolved:  08/12/24              Neck pain Problems       Neck pain Problems (Active)       PT Problem       PT Goal 1       Start:  10/04/24    Expected End:  12/18/24       Increase neck AROM by 5-10 degrees in flexion and extension and L lateral flexion to 30 degrees and L rotation to 60 degrees to improve looking up/down and turning head to look over shoulder.         PT Goal 2       Start:  10/04/24    Expected End:  12/18/24       Increase B scapular stabilizer MMT by at least 1/3 grade to reduce stress on cervicothoracic spine.         PT Goal 3       Start:  10/04/24    Expected End:  12/18/24       Pt will demonstrate improved postural awareness with decreased forward head and improved scapular retraction while sitting and standing to reduce stress on neck.         PT Goal 4       Start:  10/04/24    Expected End:  12/18/24       Pt will report at least 75% decrease in neck  pain to improve turning head and lifting objects.         PT Goal 5       Start:  10/04/24    Expected End:  12/18/24       Improve NDI score by at least 10% (MDIC).

## 2024-10-23 ENCOUNTER — OFFICE VISIT (OUTPATIENT)
Dept: OTOLARYNGOLOGY | Facility: HOSPITAL | Age: 79
End: 2024-10-23
Payer: MEDICARE

## 2024-10-23 VITALS — HEIGHT: 62 IN | BODY MASS INDEX: 44.35 KG/M2 | WEIGHT: 241 LBS

## 2024-10-23 DIAGNOSIS — K21.00 GASTROESOPHAGEAL REFLUX DISEASE WITH ESOPHAGITIS WITHOUT HEMORRHAGE: ICD-10-CM

## 2024-10-23 DIAGNOSIS — R07.0 THROAT PAIN: ICD-10-CM

## 2024-10-23 DIAGNOSIS — R04.0 EPISTAXIS: Primary | ICD-10-CM

## 2024-10-23 PROCEDURE — 1126F AMNT PAIN NOTED NONE PRSNT: CPT | Performed by: NURSE PRACTITIONER

## 2024-10-23 PROCEDURE — 99203 OFFICE O/P NEW LOW 30 MIN: CPT | Performed by: NURSE PRACTITIONER

## 2024-10-23 PROCEDURE — 1036F TOBACCO NON-USER: CPT | Performed by: NURSE PRACTITIONER

## 2024-10-23 PROCEDURE — 1160F RVW MEDS BY RX/DR IN RCRD: CPT | Performed by: NURSE PRACTITIONER

## 2024-10-23 PROCEDURE — 1159F MED LIST DOCD IN RCRD: CPT | Performed by: NURSE PRACTITIONER

## 2024-10-23 PROCEDURE — 99213 OFFICE O/P EST LOW 20 MIN: CPT | Performed by: NURSE PRACTITIONER

## 2024-10-23 RX ORDER — OXYMETAZOLINE HCL 0.05 %
2 SPRAY, NON-AEROSOL (ML) NASAL EVERY 12 HOURS PRN
Qty: 30 ML | Refills: 0 | Status: SHIPPED | OUTPATIENT
Start: 2024-10-23 | End: 2024-10-25

## 2024-10-23 RX ORDER — SODIUM CHLORIDE/ALOE VERA
1 GEL (GRAM) NASAL AS NEEDED
Qty: 14.1 G | Refills: 2 | Status: SHIPPED | OUTPATIENT
Start: 2024-10-23

## 2024-10-23 ASSESSMENT — ENCOUNTER SYMPTOMS
DEPRESSION: 0
LOSS OF SENSATION IN FEET: 0
OCCASIONAL FEELINGS OF UNSTEADINESS: 0

## 2024-10-23 ASSESSMENT — PATIENT HEALTH QUESTIONNAIRE - PHQ9
SUM OF ALL RESPONSES TO PHQ9 QUESTIONS 1 AND 2: 2
1. LITTLE INTEREST OR PLEASURE IN DOING THINGS: SEVERAL DAYS
2. FEELING DOWN, DEPRESSED OR HOPELESS: SEVERAL DAYS

## 2024-10-23 ASSESSMENT — PAIN SCALES - GENERAL: PAINLEVEL_OUTOF10: 0-NO PAIN

## 2024-10-23 NOTE — PATIENT INSTRUCTIONS
Stefano Murray,  Welcome to the clinic of Susie Baez CNP. We are here to assist you through your ENT care at [facility].    My office number is [phone]. This number is the most direct way to communicate with the office. Juana is my  and she answers the office phone from 8am-4pm Mon-Fri. She can help you with many general questions and information. Questions that she cannot answer will be directed appropriately to me. You may need to leave a message. In this case, someone from the team will call you back.    Sometimes other team members will also be involved in your care. This may include dieticians, social workers, speech therapists, medical oncologist or radiation oncologists. I will provide these referrals as needed. Please let me know if you would like to request a specific referral.    I look forward to working with you to meet your healthcare goals.     Recommendations:  -Afrin 3x daily to both nares for 3 days-> IF YOU BLEED  -If patient has bleeding spray afrin liberally into both nares and hold pressure for 15 minutes, repeat this 3x  -Nasal saline 5 sprays 4x daily as needed  -Ayr Gel in each nostril at bedtime  -BP control  -Humidification  -Stop using fingers to removed crusted mucus from nose

## 2024-10-23 NOTE — PROGRESS NOTES
Ear Nose & Throat Clinic Note          10/23/24  Celeste Murray is a 79 y.o. year old female patient with   epistaxis referred for Referral to ENT.      : 1945    HPI:  Celeste Murray presents for follow up after being seen in the ED for a large nose bleed. Patient was not sure what lead up to the bleed. She does have some contributing factors that includes HTN, dryness, Picking and sinus issues.       ROS:  Unless mentioned in the HPI, all other systems have been reviewed and ar negative.    Constitutional:   General appearance: Healthy appearing, obese, well-nourished, well groomed.  No acute distress.  Communication: Normal communication without aids or , normal voice quality.  No hoarseness, stridor or stertor.    Psychiatric: Oriented to person, place and time.  Normal mood and affect.    Neurologic: Cranial nerves II-XII grossly intact and symmetric bilaterally.    Head and Face:  Head: Atraumatic with no masses, lesions or scarring.  Face: Normal symmetry, no paralysis, synkinesis or facial tic.  No scars or deformities.  Sinuses: Palpation of the face revealed no sinus tenderness  Salivary glands: No tenderness of the parotid gland, no parotid masses.  No tenderness of the submandibular glands, no submandibular gland masses.  TMJ: Normal, no clicking or pain with palpation.    Eyes: EOMI, PERRL, conjunctiva not edematous or erythematous    Ears: External inspection of ears with no deformity, scars or masses.  Otoscopic examination: Auditory canals with normal appearance, no cerumen obstruction, erythema or edema.  Tympanic membranes intact without middle ear effusion.  Assessment of hearing with normal clinical speech reception to voice.      Nose: External inspection of nose: No nasal lesions, lacerations or scars.  Septum midline.  No inferior turbinate hypertrophy.  Patent with good air entry bilaterally.    Oral Cavity/Mouth: No masses, lesions or ulcers along the lips, gingival or  buccal mucosa.  Floor of the mouth is soft without edema or masses.  Teeth in fair condition.  No masses, lesions or ulcers along the tongue.  Oral cavity and oropharynx mucosa moist.  Hard and soft palate intact and symmetric with no masses or lesions.  Posterior oropharynx patent.  Palate, posterior pharyngeal walls and tonsils normal, symmetric with no masses, lesions or ulcers.      Neck: Normal appearing, symmetric, trachea midline.  No crepitus.  Thyroid: Symmetrical, no enlargement, no tenderness, no nodules.    Lymphatic: No palpable cervical lymphadenopathy, no submandibular lymphadenopathy, no supraclavicular lymphadenopathy.    Cardiovascular: Examination of peripheral vascular system shows no clubbing or cyanosis.    Respiratory: No respiratory distress increased work of breathing.  Inspection of the chest with symmetric chest expansion and normal respiratory effort.    Skin: No rashes in the head or neck        Assessment:   79 year old female with history of epistaxis requiring treatment in the Emergency department. Patient with no active areas of bleeding.    Recommendations:  -Afrin 3x daily to both nares for 3 days-> IF YOU BLEED  -If patient has bleeding spray afrin liberally into both nares and hold pressure for 15 minutes, repeat this 3x  -Nasal saline 5 sprays 4x daily as needed  -Ayr Gel in each nostril at bedtime  -BP control  -Humidification  -Stop using fingers to removed crusted mucus from nose  -Patient verbalized understanding     Total time spent today was 35 minutes, all of which was spent coordinating patients care      JOSE Ballard-CNP .

## 2024-10-25 ENCOUNTER — TREATMENT (OUTPATIENT)
Dept: PHYSICAL THERAPY | Facility: CLINIC | Age: 79
End: 2024-10-25
Payer: MEDICARE

## 2024-10-25 DIAGNOSIS — M25.511 PAIN IN RIGHT SHOULDER: ICD-10-CM

## 2024-10-25 PROCEDURE — 97110 THERAPEUTIC EXERCISES: CPT | Mod: GP,CQ

## 2024-10-25 NOTE — PROGRESS NOTES
"Physical Therapy    Physical Therapy Evaluation and Treatment      Patient Name: Celeste Murray  MRN: [id number]  Today's Date: 10/25/2024  Time Entry:   Time Calculation  Start Time: 1145  Stop Time: 1235  Time Calculation (min): 50 min     PT Therapeutic Procedures Time Entry  Therapeutic Exercise Time Entry: 40     Insurance: [place] Dual [employer]vantage and [facility] Secondary  Needs authorization 6 visits(10/4-12/2/24)  Visit #4/6    Assessment:  Difficulty w/ IR/flex, increased pain and spasming, therefore did not add.    Plan:   Raises, [employer]/arrow      Problem List Items Addressed This Visit    None  Visit Diagnoses         Codes    Pain in right shoulder     M25.511            Subjective    \"Less stiff today.  I am not as strong as I need to be with my weight.\"    Pain:  0/10 R shoulder    Objective   AROM R shoulder IR to belt line    Neck AROM (degrees):   Flexion- 40 *painful  Extension- 50  Lateral flexion- R- 30; L- 20 *painful  Rotation- R- 60; L- 50 *painful    Shoulder AROM: slight restriction in R elevation vs L    Shoulder MMT (R/L):   Flexion- 4+/5; 4+/5  Abduction- 4+/5; 4+/5  ER- 4/5; 4+/5  IR- 4+/5; 4+/5  Serratus anterior- 4-/5; 4-/5    Outcome Measures:  NDI: 26 = 52%    Treatments:  Therapeutic Exercise:   UBE 2/2  Red band rows, ext, ER x 20 ea(IR,flex were painful)  Pball flexion into wall on table x 20  Chin tuck x 10  Tuck w/ rotation x 10 ea  Cervical ext over towel x 15  Serratus Pball on wall x 20  HP to c-spine EOS    EDUCATION:  Access Code: [email or web address]  URL: [email or web address]  Date: 10/04/2024  Prepared by: Ramy Ivan    Exercises  - Seated Cervical Retraction  - 2 x daily - 7 x weekly - 1 sets - 10 reps - 3 seconds hold  - Seated Cervical Retraction and Rotation  - 2 x daily - 7 x weekly - 1 sets - 10 reps - 3 seconds hold  - Seated Scapular Retraction  - 2 x daily - 7 x weekly - 1 sets - 10 reps - 3 seconds hold  - Seated Shoulder Rolls  - 2 x daily - 7 " x weekly - 1 sets - 10 reps    Goals:  R>L shoulder pain Problems       R>L shoulder pain Problems (Resolved)       PT Problem       Increase neck AROM to 30-35 degrees lateral flexion and L rotation to 60 degrees to improve neck mobility with ADLs. (Adequate for Discharge)       Start:  03/21/24    Expected End:  06/03/24    Resolved:  08/12/24         Increase R shoulder AROM to at least 130 degrees- flexion, functional ER reach to T2 and functional IR reach to at least L1 and B abduction to at least 120 degrees to improve reaching and dressing. (Met)       Start:  03/21/24    Expected End:  06/03/24    Resolved:  08/12/24         Increase B shoulder MMT by at least 1/3 grade to improve reaching and lifting. (Met)       Start:  03/21/24    Expected End:  06/03/24    Resolved:  08/12/24         Pt will report at least 75% decrease in pain to improve functional use of arm for ADLs and sleeping. (Met)       Start:  03/21/24    Expected End:  06/03/24    Resolved:  08/12/24         Improve Quick DASH by at least 15.91 points (MDIC). (Met)       Start:  03/21/24    Expected End:  06/03/24    Resolved:  08/12/24              Neck pain Problems       Neck pain Problems (Active)       PT Problem       PT Goal 1       Start:  10/04/24    Expected End:  12/18/24       Increase neck AROM by 5-10 degrees in flexion and extension and L lateral flexion to 30 degrees and L rotation to 60 degrees to improve looking up/down and turning head to look over shoulder.         PT Goal 2       Start:  10/04/24    Expected End:  12/18/24       Increase B scapular stabilizer MMT by at least 1/3 grade to reduce stress on cervicothoracic spine.         PT Goal 3       Start:  10/04/24    Expected End:  12/18/24       Pt will demonstrate improved postural awareness with decreased forward head and improved scapular retraction while sitting and standing to reduce stress on neck.         PT Goal 4       Start:  10/04/24    Expected End:   12/18/24       Pt will report at least 75% decrease in neck pain to improve turning head and lifting objects.         PT Goal 5       Start:  10/04/24    Expected End:  12/18/24       Improve NDI score by at least 10% (MDIC).

## 2024-10-30 ENCOUNTER — OFFICE VISIT (OUTPATIENT)
Dept: PRIMARY CARE | Facility: CLINIC | Age: 79
End: 2024-10-30
Payer: MEDICARE

## 2024-10-30 VITALS
RESPIRATION RATE: 16 BRPM | HEIGHT: 62 IN | SYSTOLIC BLOOD PRESSURE: 118 MMHG | TEMPERATURE: 97.4 F | DIASTOLIC BLOOD PRESSURE: 75 MMHG | OXYGEN SATURATION: 97 % | WEIGHT: 242 LBS | HEART RATE: 75 BPM | BODY MASS INDEX: 44.53 KG/M2

## 2024-10-30 DIAGNOSIS — I10 HYPERTENSION WITH GOAL BLOOD PRESSURE LESS THAN 130/80: ICD-10-CM

## 2024-10-30 DIAGNOSIS — K59.09 OTHER CONSTIPATION: Primary | ICD-10-CM

## 2024-10-30 DIAGNOSIS — G47.30 OBSERVED SLEEP APNEA: ICD-10-CM

## 2024-10-30 PROCEDURE — 99214 OFFICE O/P EST MOD 30 MIN: CPT | Performed by: FAMILY MEDICINE

## 2024-10-30 PROCEDURE — 1126F AMNT PAIN NOTED NONE PRSNT: CPT | Performed by: FAMILY MEDICINE

## 2024-10-30 PROCEDURE — 3078F DIAST BP <80 MM HG: CPT | Performed by: FAMILY MEDICINE

## 2024-10-30 PROCEDURE — 1036F TOBACCO NON-USER: CPT | Performed by: FAMILY MEDICINE

## 2024-10-30 PROCEDURE — 3074F SYST BP LT 130 MM HG: CPT | Performed by: FAMILY MEDICINE

## 2024-10-30 PROCEDURE — G2211 COMPLEX E/M VISIT ADD ON: HCPCS | Performed by: FAMILY MEDICINE

## 2024-10-30 PROCEDURE — 1159F MED LIST DOCD IN RCRD: CPT | Performed by: FAMILY MEDICINE

## 2024-10-30 ASSESSMENT — PAIN SCALES - GENERAL: PAINLEVEL_OUTOF10: 0-NO PAIN

## 2024-11-05 ENCOUNTER — APPOINTMENT (OUTPATIENT)
Dept: ORTHOPEDIC SURGERY | Facility: CLINIC | Age: 79
End: 2024-11-05
Payer: MEDICARE

## 2024-11-11 ENCOUNTER — TREATMENT (OUTPATIENT)
Dept: PHYSICAL THERAPY | Facility: CLINIC | Age: 79
End: 2024-11-11
Payer: MEDICARE

## 2024-11-11 DIAGNOSIS — M54.2 CHRONIC NECK PAIN: ICD-10-CM

## 2024-11-11 DIAGNOSIS — G89.29 CHRONIC NECK PAIN: ICD-10-CM

## 2024-11-11 DIAGNOSIS — S16.1XXD STRAIN OF NECK MUSCLE, SUBSEQUENT ENCOUNTER: ICD-10-CM

## 2024-11-11 PROCEDURE — 97110 THERAPEUTIC EXERCISES: CPT | Mod: GP,CQ

## 2024-11-11 NOTE — PROGRESS NOTES
"Physical Therapy    Physical Therapy Evaluation and Treatment      Patient Name: Celeste Murray  MRN: [id number]  Today's Date: 11/11/2024  Time Entry:   Time Calculation  Start Time: 1159  Stop Time: 1250  Time Calculation (min): 51 min     PT Therapeutic Procedures Time Entry  Therapeutic Exercise Time Entry: 40     Insurance: [employer]age and [facility] Secondary  Needs authorization 6 visits(10/4-12/2/24)  Visit #5/6    Assessment:  Attempted wand ext, upright posture , and bent over rows, all increasing posterior shoulder pain.    Plan:   Raises, [place]/arrow      Problem List Items Addressed This Visit             ICD-10-CM    Chronic neck pain M54.2, G89.29     Other Visit Diagnoses         Codes    Strain of neck muscle, subsequent encounter     S16.1XXD            Subjective    \"No HA's, and R shoulder is painful posteriorly.  I think I will get the shot for the R shoulder on the 13th.\"    Pain:  Posterior R shoulder tender on palpation    Objective   AROM c-spine 53` bilat    Neck AROM (degrees):   Flexion- 40 *painful  Extension- 50  Lateral flexion- R- 30; L- 20 *painful  Rotation- R- 60; L- 50 *painful    Shoulder AROM: slight restriction in R elevation vs L    Shoulder MMT (R/L):   Flexion- 4+/5; 4+/5  Abduction- 4+/5; 4+/5  ER- 4/5; 4+/5  IR- 4+/5; 4+/5  Serratus anterior- 4-/5; 4-/5    Outcome Measures:  NDI: 26 = 52%    Treatments:  Therapeutic Exercise:   UBE 2/2, not today  Red band rows, bilat ext, ER x 20 ea  Chin tuck x 10  Tuck w/ rotation x 10 ea  Tuck w/ SB x 10 ea  Cervical ext over towel x 15  Serratus on wall x 20, YTB  HP to R shoulder EOS    EDUCATION:  Access Code: [email or web address]  URL: [email or web address]  Date: 10/04/2024  Prepared by: Ramy Ivan    Exercises  - Seated Cervical Retraction  - 2 x daily - 7 x weekly - 1 sets - 10 reps - 3 seconds hold  - Seated Cervical Retraction and Rotation  - 2 x daily - 7 x weekly - 1 sets - 10 reps - 3 seconds " hold  - Seated Scapular Retraction  - 2 x daily - 7 x weekly - 1 sets - 10 reps - 3 seconds hold  - Seated Shoulder Rolls  - 2 x daily - 7 x weekly - 1 sets - 10 reps    Goals:  R>L shoulder pain Problems       R>L shoulder pain Problems (Resolved)       PT Problem       Increase neck AROM to 30-35 degrees lateral flexion and L rotation to 60 degrees to improve neck mobility with ADLs. (Adequate for Discharge)       Start:  03/21/24    Expected End:  06/03/24    Resolved:  08/12/24         Increase R shoulder AROM to at least 130 degrees- flexion, functional ER reach to T2 and functional IR reach to at least L1 and B abduction to at least 120 degrees to improve reaching and dressing. (Met)       Start:  03/21/24    Expected End:  06/03/24    Resolved:  08/12/24         Increase B shoulder MMT by at least 1/3 grade to improve reaching and lifting. (Met)       Start:  03/21/24    Expected End:  06/03/24    Resolved:  08/12/24         Pt will report at least 75% decrease in pain to improve functional use of arm for ADLs and sleeping. (Met)       Start:  03/21/24    Expected End:  06/03/24    Resolved:  08/12/24         Improve Quick DASH by at least 15.91 points (MDIC). (Met)       Start:  03/21/24    Expected End:  06/03/24    Resolved:  08/12/24              Neck pain Problems       Neck pain Problems (Active)       PT Problem       PT Goal 1       Start:  10/04/24    Expected End:  12/18/24       Increase neck AROM by 5-10 degrees in flexion and extension and L lateral flexion to 30 degrees and L rotation to 60 degrees to improve looking up/down and turning head to look over shoulder.         PT Goal 2       Start:  10/04/24    Expected End:  12/18/24       Increase B scapular stabilizer MMT by at least 1/3 grade to reduce stress on cervicothoracic spine.         PT Goal 3       Start:  10/04/24    Expected End:  12/18/24       Pt will demonstrate improved postural awareness with decreased forward head and improved  scapular retraction while sitting and standing to reduce stress on neck.         PT Goal 4       Start:  10/04/24    Expected End:  12/18/24       Pt will report at least 75% decrease in neck pain to improve turning head and lifting objects.         PT Goal 5       Start:  10/04/24    Expected End:  12/18/24       Improve NDI score by at least 10% (MDIC).

## 2024-11-13 ENCOUNTER — APPOINTMENT (OUTPATIENT)
Dept: ORTHOPEDIC SURGERY | Facility: CLINIC | Age: 79
End: 2024-11-13
Payer: MEDICARE

## 2024-11-13 DIAGNOSIS — M25.511 CHRONIC RIGHT SHOULDER PAIN: ICD-10-CM

## 2024-11-13 DIAGNOSIS — M19.011 ARTHRITIS OF RIGHT SHOULDER REGION: Primary | ICD-10-CM

## 2024-11-13 DIAGNOSIS — G89.29 CHRONIC RIGHT SHOULDER PAIN: ICD-10-CM

## 2024-11-13 PROCEDURE — 20610 DRAIN/INJ JOINT/BURSA W/O US: CPT | Mod: RT | Performed by: STUDENT IN AN ORGANIZED HEALTH CARE EDUCATION/TRAINING PROGRAM

## 2024-11-13 PROCEDURE — 99214 OFFICE O/P EST MOD 30 MIN: CPT | Performed by: STUDENT IN AN ORGANIZED HEALTH CARE EDUCATION/TRAINING PROGRAM

## 2024-11-13 PROCEDURE — 2500000004 HC RX 250 GENERAL PHARMACY W/ HCPCS (ALT 636 FOR OP/ED): Performed by: STUDENT IN AN ORGANIZED HEALTH CARE EDUCATION/TRAINING PROGRAM

## 2024-11-13 RX ORDER — LIDOCAINE HYDROCHLORIDE 10 MG/ML
5 INJECTION, SOLUTION INFILTRATION; PERINEURAL
Status: COMPLETED | OUTPATIENT
Start: 2024-11-13 | End: 2024-11-13

## 2024-11-13 RX ORDER — TRIAMCINOLONE ACETONIDE 40 MG/ML
80 INJECTION, SUSPENSION INTRA-ARTICULAR; INTRAMUSCULAR
Status: COMPLETED | OUTPATIENT
Start: 2024-11-13 | End: 2024-11-13

## 2024-11-13 NOTE — PROGRESS NOTES
CHIEF COMPLAINT:   No chief complaint on file.    History: 79 y.o. female returns to the office for follow-up of her right shoulder.  She has known shoulder arthritis.  We gave her an injection about 5 months ago.  Unfortunately it is starting to wear off.    9/3/24: returns the office today for follow-up of her right shoulder.  She has known shoulder arthritis.  We gave her injection 3 months ago.  He is doing very well.  In fact, she really is not having much pain today.  Able to use the shoulder as tolerated.    6/4/24: presents to the office today for evaluation of her right shoulder.  The patient is right-hand dominant.  She is no longer working.  She states that she actually injured her shoulder at her previous job about a year and a half ago when she fell directly onto the right shoulder.  Since that time she has had extensive physical therapy.  She actually is in therapy again has not been in therapy for more than 3 months at this point.  Continues to have pain and mechanical symptoms of the right shoulder.  States that the shoulder locks sometimes.  Not had any injections.  Pain is really deep and anterior.  Also has some cervical complaints today.      Past medical history, past surgical history, medications, allergies, family history, social history, and review of systems were reviewed today.    A 12 point review of systems was negative other than as stated in the HPI.    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    Hyperopia of both eyes with astigmatism and presbyopia    Other conditions influencing health status     Arthritis    Personal history of other diseases of the circulatory system     History of cardiac murmur    Personal history of other endocrine, nutritional and metabolic disease     History of  thyroid disease    Pure hypercholesterolemia, unspecified     High cholesterol        Allergies   Allergen Reactions    Iodinated Contrast Media Anaphylaxis    Propoxyphene Unknown, Anaphylaxis and Other    Propoxyphene Hcl Anaphylaxis    Barium Sulfate Unknown and Other    Lisinopril Other and GI Upset     Gastrointestinal upset        Past Surgical History:   Procedure Laterality Date    MR HEAD ANGIO WO IV CONTRAST  12/27/2012    MR HEAD ANGIO WO IV CONTRAST 12/27/2012 [facility]    OTHER SURGICAL HISTORY  09/08/2014    History Of Prior Surgery    TONSILLECTOMY  08/10/2015    Tonsillectomy        Family History   Problem Relation Name Age of Onset    Diabetes Sister      Diabetes Brother      Diabetes Sibling          Social History     Socioeconomic History    Marital status:      Spouse name: Not on file    Number of children: Not on file    Years of education: Not on file    Highest education level: Not on file   Occupational History    Not on file   Tobacco Use    Smoking status: Former     Types: Cigarettes     Passive exposure: Past    Smokeless tobacco: Never   Substance and Sexual Activity    Alcohol use: Not Currently    Drug use: Never    Sexual activity: Not on file   Other Topics Concern    Not on file   Social History Narrative    Not on file     Social Drivers of Health     Financial Resource Strain: Not on file   Food Insecurity: Unknown (2/6/2024)    [facility]    Hunger Vital Sign     Worried About Running Out of Food in the Last Year: Never true     Ran Out of Food in the Last Year: Not on file   Transportation Needs: Not on file   Physical Activity: Not on file   Stress: Not on file   Social Connections: Not on file   Intimate Partner Violence: Not on file   Housing Stability: Not on file        CURRENT MEDICATIONS:   Current Outpatient Medications   Medication Sig Dispense Refill    acetaminophen (Tylenol 8 Hour) 650 mg ER tablet Take by  mouth. Use as directed      ascorbic acid (Vitamin C) 1,000 mg tablet Take 1 tablet (1,000 mg) by mouth once daily.      aspirin 81 mg EC tablet Take 1 tablet (81 mg) by mouth once daily. As directed      Autolet (OneTouch Delica Plus Lanc Dev) lancing device For daily BG check 1 each 0    blood sugar diagnostic (Blood Glucose Test) strip For daily BG check 100 strip 5    blood-glucose meter misc For blood sugar checks 1 each 0    calcium carbonate 600 mg calcium (1,500 mg) tablet Take 1 tablet (600 mg) by mouth once daily.      cholecalciferol (Vitamin D3) 5,000 Units tablet Take 1 tablet (5,000 Units) by mouth once daily. 90 tablet 3    cyclobenzaprine (Flexeril) 10 mg tablet Take 1 tablet (10 mg) by mouth as needed at bedtime for muscle spasms. 60 tablet 0    fluticasone (Flonase) 50 mcg/actuation nasal spray Administer 1 spray into each nostril once daily. 16 g 2    lancets (Lancets,Ultra Thin) misc For daily BG checks 100 each 2    losartan-hydrochlorothiazide (Hyzaar) 50-12.5 mg tablet Take 1 tablet by mouth once daily. 90 tablet 1    multivitamin (One Daily Multivitamin) tablet Take 1 tablet by mouth once daily.      oxymetazoline (Afrin) 0.05 % nasal spray Administer 2 sprays into each nostril every 12 hours if needed (for nose bleeding) for up to 2 days. Do not use for more than 3 days. 30 mL 0    psyllium (Metamucil) 3.4 gram packet Take 1 packet by mouth once daily. Mix and drink with at least 8 ounces of water or juice. 30 packet 3    sodium chloride (Ocean) 0.65 % nasal spray Administer 1 spray into each nostril if needed for congestion (and dry nose). 30 mL 12    sodium chloride-Aloe vera gel (Ayr Saline) gel topical gel Apply 1 Application to affected nostril(s) if needed (dry nose). 14.1 g 2    zinc sulfate (Zincate) 220 (50 Zn) MG capsule Take 1 capsule (50 mg of elemental zinc) by mouth once daily.       No current facility-administered medications for this visit.       Physical Examination:       "8/22/2024     3:00 PM 8/22/2024     3:10 PM 8/22/2024     3:20 PM 8/22/2024     3:40 PM 9/19/2024     2:47 PM 10/23/2024    11:44 AM 10/30/2024     2:04 PM   Vitals   Systolic 140 138 155 157 165  118   Diastolic 65 70 69 70 76  75   Heart Rate 60 67 65 66 64  75   Temp       36.3 °C (97.4 °F)   Resp 17 15 15 16   16   Height (in)     1.575 m (5' 2\") 1.575 m (5' 2\") 1.575 m (5' 2\")   Weight (lb)     244.2 241 242   BMI     44.66 kg/m2 44.08 kg/m2 44.26 kg/m2   BSA (m2)     2.2 m2 2.18 m2 2.19 m2   Visit Report     Report Report Report      There is no height or weight on file to calculate BMI.    Well-appearing, appears stated age, pleasant and cooperative, appropriate mood and behavior. Height and weight reviewed. Alert and oriented x3.  Auditory function intact.  No acute distress.  Intact ocular function, LILIYA, EOMI. Breathing is unlabored .  There is no evidence of jugular venous distension. Skin appearance is normal without evidence of rash or other lesions. 2+ radial pulses bilaterally, fingers pink and wwp, good capillary refill, no pitting edema. No appreciable lymphadenopathy in bilateral upper extremities. SILT throughout both upper extremities, median/radial/ulnar/musculocutaneous/axillary nerve motor and sensory intact (except for abnormalities noted in focused musculoskeletal exam section below).     Neck exam: Full range of motion of the neck in flexion/extension and rotational movements. No significant areas of tenderness to palpation in the neck.    On exam of bilateral upper extremities, she has she has relatively preserved range of motion of the right shoulder in forward flexion, active forward flexion 130 compared to 140 on the left, external rotation though is limited, 10 degrees on the right compared to 30 on the left.  Internal rotation to the side on the right compared to L5 on the left.  Rotator cuff strength is preserved but painful on the right side.    Imaging: Radiographs of the right " shoulder were reviewed today.  First interpreted by myself.  Findings consistent with glenohumeral arthritis with loss of glenohumeral joint space and osteophyte formation.  Humeral head is centered on the axillary view.    Assessment: Right shoulder osteoarthritis    Plan: Patient's symptoms, exam and imaging findings are consistent with end stage glenohumeral osteoarthritis.  We discussed the natural history of this.  We discussed that with shoulder osteoarthritis, there is loss of cartilage on both the humerus and glenoid, bony erosion of the glenoid, and extra bone formation called osteophytes on the humerus and glenoid.  We discussed that there are different patterns of arthritis.  Conservative treatment for glenohumeral arthritis is limited, and can include activity modification, injections and physical therapy, although with end stage bone on bone arthritis the benefits of physical therapy may be limited. The final option would be to have a shoulder replacement. After discussion, the patient responded well to the cortisone injection but it has worn off.  We repeated another 1 today.  Will have her follow-up in 4 months for likely repeat injection.    Injection was performed, please see separate procedure note, patient tolerated well.     Patient ID: Celeste Murray is a 79 y.o. female.    L Inj/Asp: R glenohumeral on 11/13/2024 12:15 PM  Indications: pain  Details: 22 G needle, anterior approach  Medications: 80 mg triamcinolone acetonide 40 mg/mL; 5 mL lidocaine 10 mg/mL (1 %)  Outcome: tolerated well, no immediate complications  Procedure, treatment alternatives, risks and benefits explained, specific risks discussed. Consent was given by the patient. Immediately prior to procedure a time out was called to verify the correct patient, procedure, equipment, support staff and site/side marked as required. Patient was prepped and draped in the usual sterile fashion.                     Dragon software was used  to dictate this note, please be aware that minor errors in transcription may be present.    Glen Karimi MD    Shoulder/Elbow Surgery  [facility]

## 2024-11-15 ENCOUNTER — APPOINTMENT (OUTPATIENT)
Dept: OPHTHALMOLOGY | Facility: CLINIC | Age: 79
End: 2024-11-15
Payer: MEDICARE

## 2024-11-15 DIAGNOSIS — H18.513 FUCHS' CORNEAL DYSTROPHY OF BOTH EYES: ICD-10-CM

## 2024-11-15 DIAGNOSIS — H18.603 KERATOCONUS OF BOTH EYES: ICD-10-CM

## 2024-11-15 DIAGNOSIS — H25.813 COMBINED FORM OF AGE-RELATED CATARACT, BOTH EYES: Primary | ICD-10-CM

## 2024-11-15 DIAGNOSIS — H18.513 CORNEAL GUTTATA OF BOTH EYES: ICD-10-CM

## 2024-11-15 DIAGNOSIS — E11.9 TYPE 2 DIABETES MELLITUS WITHOUT RETINOPATHY (MULTI): ICD-10-CM

## 2024-11-15 PROCEDURE — 92012 INTRM OPH EXAM EST PATIENT: CPT | Performed by: STUDENT IN AN ORGANIZED HEALTH CARE EDUCATION/TRAINING PROGRAM

## 2024-11-15 ASSESSMENT — CUP TO DISC RATIO
OD_RATIO: .35
OS_RATIO: .35

## 2024-11-15 ASSESSMENT — CONF VISUAL FIELD
OD_NORMAL: 1
OS_SUPERIOR_NASAL_RESTRICTION: 0
OD_INFERIOR_TEMPORAL_RESTRICTION: 0
OS_INFERIOR_NASAL_RESTRICTION: 0
METHOD: COUNTING FINGERS
OD_SUPERIOR_TEMPORAL_RESTRICTION: 0
OS_INFERIOR_TEMPORAL_RESTRICTION: 0
OD_INFERIOR_NASAL_RESTRICTION: 0
OD_SUPERIOR_NASAL_RESTRICTION: 0
OS_NORMAL: 1
OS_SUPERIOR_TEMPORAL_RESTRICTION: 0

## 2024-11-15 ASSESSMENT — VISUAL ACUITY
OS_SC+: +2
OD_SC+: -2+2
OD_SC: 20/30
METHOD: SNELLEN - LINEAR
OS_SC: 20/30

## 2024-11-15 ASSESSMENT — ENCOUNTER SYMPTOMS
EYES NEGATIVE: 0
ALLERGIC/IMMUNOLOGIC NEGATIVE: 0
CONSTITUTIONAL NEGATIVE: 0
GASTROINTESTINAL NEGATIVE: 0
ENDOCRINE NEGATIVE: 0
CARDIOVASCULAR NEGATIVE: 0
MUSCULOSKELETAL NEGATIVE: 0
RESPIRATORY NEGATIVE: 0
HEMATOLOGIC/LYMPHATIC NEGATIVE: 0
PSYCHIATRIC NEGATIVE: 0
NEUROLOGICAL NEGATIVE: 0

## 2024-11-15 ASSESSMENT — SLIT LAMP EXAM - LIDS
COMMENTS: GOOD POSITION, MGD UL/LL
COMMENTS: GOOD POSITION, MGD UL/LL

## 2024-11-15 ASSESSMENT — REFRACTION_WEARINGRX
OD_AXIS: 020
OS_SPHERE: PLANO
OS_CYLINDER: -3.25
OD_SPHERE: -1.25
OD_CYLINDER: -3.00
OS_ADD: +2.50
OD_ADD: +2.50
OS_AXIS: 155

## 2024-11-15 ASSESSMENT — REFRACTION_MANIFEST
OD_AXIS: 060
OS_AXIS: 155
OD_ADD: +2.50
OD_SPHERE: PLANO
OS_SPHERE: PLANO
OS_ADD: +2.50
OS_CYLINDER: -2.50
OD_CYLINDER: -1.50

## 2024-11-15 ASSESSMENT — EXTERNAL EXAM - LEFT EYE: OS_EXAM: NORMAL

## 2024-11-15 ASSESSMENT — EXTERNAL EXAM - RIGHT EYE: OD_EXAM: NORMAL

## 2024-11-15 ASSESSMENT — TONOMETRY
OD_IOP_MMHG: 17
IOP_METHOD: TONOPEN
OS_IOP_MMHG: 20

## 2024-11-15 NOTE — PROGRESS NOTES
Assessment/Plan   Diagnoses and all orders for this visit:  Type 2 diabetes mellitus without retinopathy (CMS/HCC)  -No retinopathy observed on exam today od/os, pt ed to continue good BGlc, blood pressure and lipid control, rtc with any changes in vision, otherwise monitor 1 year  Keratoconus of both eyes  Irregular astigmatism of both eyes  -hx of trying contact lenses and having difficulty   -prefers specs at this time  -BCVA with MRX today OD 20/40 OS 20/30  K values 2024 stable to Pentecam 2021:  OD: K1: 44.75 K2: 48.25 OS: K1: 45.00 K2: 49.25  Fuchs' corneal dystrophy of both eyes  Corneal guttata of both eyes  -mild continue to monitor  Combined form of age-related cataract, both eyes  -contributing to reduction in BCVA but unable to assess full potential with concurrent KCN  -will monitor at this time    RTC 12 months for annual with DFE, MRX, OCT MAC

## 2024-11-20 ENCOUNTER — APPOINTMENT (OUTPATIENT)
Dept: PHYSICAL THERAPY | Facility: CLINIC | Age: 79
End: 2024-11-20
Payer: MEDICARE

## 2024-12-10 NOTE — PROGRESS NOTES
Gastroenterology Clinic Consult Note    Reason For Consult  Nausea and abdominal bloating    History Of Present Illness  Celeste Murray is a 79 y.o. female with a past medical history of T2DM, HTN, HFpEF, morbid obesity (BMI 44), multinodular goiter, former tobacco abuse who was referred to GI clinic for nausea and abdominal bloating    Was referred by ENT Susie BRAVO when she was seen in ENT clinic on 10/23/24 for epistaxis. No mention of why referral was made in office note.    Today, patient reports over the last year, She feels nauseated all the time. She has halitosis but no regurgitation. And stomach bloating. Denies any heartburn. Denies any dysphagia. She takes lemon juice water to help with the nausea. Watching what she eats helps - she avoids tomato sauce and salt. She get a scratchy pain in the PUSHPA area that sometimes is worse after eating. Takes Motrin PRN about 2-3 times per week.    Not on a PPI - says prilosec sent her to the hospital with a bowel blockage in the past. She is not interested in trying a different PPI because of this.    Previously saw GI Dr. Kang for dysphagia in 2021. Was sent for barium esophagram but had allergic reaction to barium so test was never completed. She was lost to follow up after this.      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    Hyperopia of both eyes with astigmatism and presbyopia    Other conditions influencing health status     Arthritis    Personal history of other diseases of the circulatory system     History of cardiac murmur    Personal history of other endocrine, nutritional and metabolic disease     History of thyroid disease    Pure hypercholesterolemia, unspecified     High cholesterol       Surgical History  Past  Surgical History:   Procedure Laterality Date    MR HEAD ANGIO WO IV CONTRAST  12/27/2012    MR HEAD ANGIO WO IV CONTRAST 12/27/2012 [facility]    OTHER SURGICAL HISTORY  09/08/2014    History Of Prior Surgery    TONSILLECTOMY  08/10/2015    Tonsillectomy       Social History  Social Drivers of Health     Tobacco Use: Medium Risk (12/12/2024)    Patient History     Smoking Tobacco Use: Former     Smokeless Tobacco Use: Never     Passive Exposure: Past   Alcohol Use: Not on file   Financial Resource Strain: Not on file   Food Insecurity: Unknown (2/6/2024)    [facility]    Hunger Vital Sign     Worried About Running Out of Food in the Last Year: Never true     Ran Out of Food in the Last Year: Not on file   Transportation Needs: Not on file   Physical Activity: Not on file   Stress: Not on file   Social Connections: Not on file   Intimate Partner Violence: Not on file   Depression: Not at risk (10/23/2024)    PHQ-2     PHQ-2 Score: 2   Housing Stability: Not on file   Utilities: Not on file   Digital Equity: Not on file   Health Literacy: Not on file       Family History  Family History   Problem Relation Name Age of Onset    Diabetes Sister      Diabetes Brother      Diabetes Sibling          Allergies  Allergies   Allergen Reactions    Iodinated Contrast Media Anaphylaxis    Propoxyphene Unknown, Anaphylaxis and Other    Propoxyphene Hcl Anaphylaxis    Barium Sulfate Unknown and Other    Lisinopril Other and GI Upset     Gastrointestinal upset       Home Medications    Current Outpatient Medications:     ascorbic acid (Vitamin C) 1,000 mg tablet, Take 1 tablet (1,000 mg) by mouth once daily., Disp: , Rfl:     aspirin 81 mg EC tablet, Take 1 tablet (81 mg) by mouth once daily. As directed, Disp: , Rfl:     Autolet (OneTouch Delica Plus Lanc Dev) lancing device, For daily BG check, Disp: 1 each, Rfl: 0    blood sugar diagnostic (Blood Glucose Test) strip, For daily BG  check, Disp: 100 strip, Rfl: 5    blood-glucose meter misc, For blood sugar checks, Disp: 1 each, Rfl: 0    calcium carb/vitamin D3/vit K1 (CALCIUM SOFT CHEW ORAL), Take 1 capsule by mouth once daily., Disp: , Rfl:     cholecalciferol (Vitamin D3) 5,000 Units tablet, Take 1 tablet (5,000 Units) by mouth once daily., Disp: 90 tablet, Rfl: 3    fluticasone (Flonase) 50 mcg/actuation nasal spray, Administer 1 spray into each nostril once daily., Disp: 16 g, Rfl: 2    lancets (Lancets,Ultra Thin) misc, For daily BG checks, Disp: 100 each, Rfl: 2    losartan-hydrochlorothiazide (Hyzaar) 50-12.5 mg tablet, Take 1 tablet by mouth once daily., Disp: 90 tablet, Rfl: 1    multivitamin (One Daily Multivitamin) tablet, Take 1 tablet by mouth once daily., Disp: , Rfl:     NON FORMULARY, Take 1 each by mouth once daily. Probiotic green tea, Disp: , Rfl:     sodium chloride (Ocean) 0.65 % nasal spray, Administer 1 spray into each nostril if needed for congestion (and dry nose)., Disp: 30 mL, Rfl: 12    zinc sulfate (Zincate) 220 (50 Zn) MG capsule, Take 1 capsule (50 mg of elemental zinc) by mouth once daily., Disp: , Rfl:     acetaminophen (Tylenol 8 Hour) 650 mg ER tablet, Take by mouth. Use as directed (Patient not taking: Reported on 12/12/2024), Disp: , Rfl:     calcium carbonate 600 mg calcium (1,500 mg) tablet, Take 1 tablet (600 mg) by mouth once daily. (Patient not taking: Reported on 12/12/2024), Disp: , Rfl:     cyclobenzaprine (Flexeril) 10 mg tablet, Take 1 tablet (10 mg) by mouth as needed at bedtime for muscle spasms., Disp: 60 tablet, Rfl: 0    famotidine (Pepcid) 40 mg tablet, Take 1 tablet (40 mg) by mouth 2 times a day., Disp: 60 tablet, Rfl: 1    oxymetazoline (Afrin) 0.05 % nasal spray, Administer 2 sprays into each nostril every 12 hours if needed (for nose bleeding) for up to 2 days. Do not use for more than 3 days., Disp: 30 mL, Rfl: 0    psyllium (Metamucil) 3.4 gram packet, Take 1 packet by mouth once  daily. Mix and drink with at least 8 ounces of water or juice. (Patient not taking: Reported on 12/12/2024), Disp: 30 packet, Rfl: 3    sodium chloride-Aloe vera gel (Ayr Saline) gel topical gel, Apply 1 Application to affected nostril(s) if needed (dry nose). (Patient not taking: Reported on 12/12/2024), Disp: 14.1 g, Rfl: 2    Review of Systems  See above     Last Recorded Vitals  Blood pressure (!) 144/92, pulse 84, temperature 36.6 °C (97.9 °F), resp. rate (!) 28, weight 110 kg (243 lb 3.2 oz).    Physical Exam  General: well-nourished, obese no acute distress  HEENT: PERRLA, EOM intact, no scleral icterus, moist MM  Respiratory: CTA bilaterally, normal work of breathing  Cardiovascular: RRR, no murmurs/rubs/gallops  Abdomen: Soft, nontender, nondistended, bowel sounds present, no masses palpated, no organomeagly  Extremities: +ve edema, no asterixis  Neuro: alert and oriented, CNII-XII grossly intact, moves all 4 extremities, ambulates slowly with walker      Relevant Results    Current Outpatient Medications:     ascorbic acid (Vitamin C) 1,000 mg tablet, Take 1 tablet (1,000 mg) by mouth once daily., Disp: , Rfl:     aspirin 81 mg EC tablet, Take 1 tablet (81 mg) by mouth once daily. As directed, Disp: , Rfl:     Autolet (OneTouch Delica Plus Lanc Dev) lancing device, For daily BG check, Disp: 1 each, Rfl: 0    blood sugar diagnostic (Blood Glucose Test) strip, For daily BG check, Disp: 100 strip, Rfl: 5    blood-glucose meter misc, For blood sugar checks, Disp: 1 each, Rfl: 0    calcium carb/vitamin D3/vit K1 (CALCIUM SOFT CHEW ORAL), Take 1 capsule by mouth once daily., Disp: , Rfl:     cholecalciferol (Vitamin D3) 5,000 Units tablet, Take 1 tablet (5,000 Units) by mouth once daily., Disp: 90 tablet, Rfl: 3    fluticasone (Flonase) 50 mcg/actuation nasal spray, Administer 1 spray into each nostril once daily., Disp: 16 g, Rfl: 2    lancets (Lancets,Ultra Thin) misc, For daily BG checks, Disp: 100 each, Rfl:  2    losartan-hydrochlorothiazide (Hyzaar) 50-12.5 mg tablet, Take 1 tablet by mouth once daily., Disp: 90 tablet, Rfl: 1    multivitamin (One Daily Multivitamin) tablet, Take 1 tablet by mouth once daily., Disp: , Rfl:     NON FORMULARY, Take 1 each by mouth once daily. Probiotic green tea, Disp: , Rfl:     sodium chloride (Ocean) 0.65 % nasal spray, Administer 1 spray into each nostril if needed for congestion (and dry nose)., Disp: 30 mL, Rfl: 12    zinc sulfate (Zincate) 220 (50 Zn) MG capsule, Take 1 capsule (50 mg of elemental zinc) by mouth once daily., Disp: , Rfl:     acetaminophen (Tylenol 8 Hour) 650 mg ER tablet, Take by mouth. Use as directed (Patient not taking: Reported on 12/12/2024), Disp: , Rfl:     calcium carbonate 600 mg calcium (1,500 mg) tablet, Take 1 tablet (600 mg) by mouth once daily. (Patient not taking: Reported on 12/12/2024), Disp: , Rfl:     cyclobenzaprine (Flexeril) 10 mg tablet, Take 1 tablet (10 mg) by mouth as needed at bedtime for muscle spasms., Disp: 60 tablet, Rfl: 0    famotidine (Pepcid) 40 mg tablet, Take 1 tablet (40 mg) by mouth 2 times a day., Disp: 60 tablet, Rfl: 1    oxymetazoline (Afrin) 0.05 % nasal spray, Administer 2 sprays into each nostril every 12 hours if needed (for nose bleeding) for up to 2 days. Do not use for more than 3 days., Disp: 30 mL, Rfl: 0    psyllium (Metamucil) 3.4 gram packet, Take 1 packet by mouth once daily. Mix and drink with at least 8 ounces of water or juice. (Patient not taking: Reported on 12/12/2024), Disp: 30 packet, Rfl: 3    sodium chloride-Aloe vera gel (Ayr Saline) gel topical gel, Apply 1 Application to affected nostril(s) if needed (dry nose). (Patient not taking: Reported on 12/12/2024), Disp: 14.1 g, Rfl: 2     Lab Results   Component Value Date    WBC 6.5 07/03/2024    HGB 12.1 07/03/2024    HCT 36.5 07/03/2024    MCV 92 07/03/2024     07/03/2024     Lab Results   Component Value Date    GLUCOSE 97 07/09/2024     CALCIUM 9.7 2024     2024    K 4.7 2024    CO2 30 2024     2024    BUN 23 2024    CREATININE 0.90 2024     Lab Results   Component Value Date    ALT 27 2024    AST 21 2024    GGT 15 2021    ALKPHOS 72 2024    BILITOT 0.2 2024       Imagin barium esophagram  IMPRESSION:     Normal esophagram without evidence of  hiatus hernia,GE reflux or   other abnormality.     Procedures:    2020 Colonoscopy  Findings:       The digital rectal exam was normal. Pertinent negatives include normal        sphincter tone.       A 4 mm polyp was found in the transverse colon. The polyp was sessile.        The polyp was removed with a cold biopsy forceps. The polyp was removed        with a cold snare. Resection and retrieval were complete. Verification        of patient identification for the specimen was done by the physician and        nurse using the patient's name and medical record number. Estimated        blood loss was minimal.       The entire examined colon appeared normal on direct and retroflexion        views.    Impression:            - One 4 mm polyp in the transverse colon, removed with                          a cold biopsy forceps and removed with a cold snare.                          Resected and retrieved.                         - The entire examined colon is normal on direct and                          retroflexion views.  Estimated Blood Loss:       Estimated blood loss: none.  Recommendation:                              - Repeat colonoscopy in 5 years for screening purposes.    FINAL DIAGNOSIS   A.  TRANSVERSE COLON POLYP COLD BIOPSY:    -- TUBULAR ADENOMA.                           2013 Colonoscopy  Findings:       The perianal and digital rectal examinations were normal.       A sessile polyp was found in the transverse colon. The polyp was 5 mm in        size. The polyp was removed with a cold snare. Resection  and retrieval        were complete. Estimated blood loss was minimal.       The terminal ileum appeared normal.       Non-bleeding internal hemorrhoids were found during retroflexion and        were mild.  Impression:            - One 5 mm polyp in the transverse colon. Resected and                          retrieved.                         - The examined portion of the ileum was normal.                         - Non-bleeding internal hemorrhoids.    FINAL DIAGNOSIS   A. TRANSVERSE COLON, COLD SNARE BIOPSY:   -- TUBULAR ADENOMA.      ASSESSMENT/PLAN:  Celeste Murray is a 79 y.o. female with a past medical history of T2DM, HTN, HFpEF, morbid obesity (BMI 44), multinodular goiter, former tobacco abuse who was referred to GI clinic for nausea and abdominal bloating    No red flag symptoms. Consider GERD. She is resistent to PPI at this time but we can try famotidine.She reports halitosis so could consider Zenkers but unable to obtain esophagram given barium allergy. I did note a prior 2013 barium esophagram that is normal. Consider PUD in the setting of frequent NSAID use as well.      Plan:  -Start famotidine 20mg once daily  -Stop Motrin/NSAIDs  -RTC February 2025, consider EGD at that time if needed.  -Patient will be due for surveillance Colonoscopy 2025    Patient seen and discussed with Dr. Christina    I spent 45 minutes in the professional and overall care of this patient.    Kelin Horner MD

## 2024-12-12 ENCOUNTER — OFFICE VISIT (OUTPATIENT)
Dept: GASTROENTEROLOGY | Facility: HOSPITAL | Age: 79
End: 2024-12-12
Payer: MEDICARE

## 2024-12-12 VITALS
BODY MASS INDEX: 44.48 KG/M2 | RESPIRATION RATE: 28 BRPM | HEART RATE: 84 BPM | WEIGHT: 243.2 LBS | SYSTOLIC BLOOD PRESSURE: 144 MMHG | TEMPERATURE: 97.9 F | DIASTOLIC BLOOD PRESSURE: 92 MMHG

## 2024-12-12 DIAGNOSIS — R11.0 NAUSEA: ICD-10-CM

## 2024-12-12 DIAGNOSIS — K21.00 GASTROESOPHAGEAL REFLUX DISEASE WITH ESOPHAGITIS WITHOUT HEMORRHAGE: ICD-10-CM

## 2024-12-12 DIAGNOSIS — K21.00 GASTROESOPHAGEAL REFLUX DISEASE WITH ESOPHAGITIS WITHOUT HEMORRHAGE: Primary | ICD-10-CM

## 2024-12-12 PROBLEM — R12 HEARTBURN: Status: ACTIVE | Noted: 2024-12-12

## 2024-12-12 RX ORDER — FAMOTIDINE 40 MG/1
40 TABLET, FILM COATED ORAL 2 TIMES DAILY
Qty: 60 TABLET | Refills: 1 | Status: SHIPPED | OUTPATIENT
Start: 2024-12-12 | End: 2025-12-12

## 2024-12-12 ASSESSMENT — ENCOUNTER SYMPTOMS
DEPRESSION: 0
OCCASIONAL FEELINGS OF UNSTEADINESS: 1
LOSS OF SENSATION IN FEET: 0

## 2024-12-12 ASSESSMENT — PAIN SCALES - GENERAL: PAINLEVEL_OUTOF10: 0-NO PAIN

## 2024-12-12 NOTE — PATIENT INSTRUCTIONS
Thank you for coming to [facility]  Please start famotidine 40mg once daily in the morning. Take this for the next 2-3 months  Try to stop taking Motrin, as this can lead to stomach ulcers  Come back and see me in February 2025 to see how you are doing

## 2024-12-16 NOTE — PROGRESS NOTES
I saw and evaluated the patient. I personally obtained the key and critical portions of the history and physical exam or was physically present for key and critical portions performed by the resident/fellow. I reviewed the resident/fellow's documentation and discussed the patient with the resident/fellow. I agree with the resident/fellow's medical decision making as documented in the note.    I spent 15 minutes in the professional and overall care of this patient.    Ori Christina MD

## 2024-12-18 RX ORDER — FAMOTIDINE 40 MG/1
TABLET, FILM COATED ORAL
Qty: 180 TABLET | Refills: 2 | Status: SHIPPED | OUTPATIENT
Start: 2024-12-18

## 2024-12-24 ENCOUNTER — HOSPITAL ENCOUNTER (OUTPATIENT)
Dept: RADIOLOGY | Facility: HOSPITAL | Age: 79
Discharge: HOME | End: 2024-12-24
Payer: MEDICARE

## 2024-12-24 DIAGNOSIS — R06.02 SHORTNESS OF BREATH: ICD-10-CM

## 2024-12-24 DIAGNOSIS — I10 HYPERTENSION, UNSPECIFIED TYPE: ICD-10-CM

## 2024-12-24 PROCEDURE — 75571 CT HRT W/O DYE W/CA TEST: CPT

## 2025-01-09 ENCOUNTER — LAB (OUTPATIENT)
Dept: LAB | Facility: LAB | Age: 80
End: 2025-01-09
Payer: MEDICARE

## 2025-01-09 ENCOUNTER — APPOINTMENT (OUTPATIENT)
Dept: ENDOCRINOLOGY | Facility: CLINIC | Age: 80
End: 2025-01-09
Payer: MEDICARE

## 2025-01-09 VITALS
SYSTOLIC BLOOD PRESSURE: 153 MMHG | TEMPERATURE: 97.5 F | HEIGHT: 62 IN | WEIGHT: 244 LBS | HEART RATE: 73 BPM | DIASTOLIC BLOOD PRESSURE: 95 MMHG | BODY MASS INDEX: 44.9 KG/M2

## 2025-01-09 DIAGNOSIS — E11.65 TYPE 2 DIABETES MELLITUS WITH HYPERGLYCEMIA, WITHOUT LONG-TERM CURRENT USE OF INSULIN: ICD-10-CM

## 2025-01-09 DIAGNOSIS — E04.1 THYROID NODULE: ICD-10-CM

## 2025-01-09 DIAGNOSIS — M85.80 OSTEOPENIA, UNSPECIFIED LOCATION: ICD-10-CM

## 2025-01-09 DIAGNOSIS — E11.65 TYPE 2 DIABETES MELLITUS WITH HYPERGLYCEMIA, WITHOUT LONG-TERM CURRENT USE OF INSULIN: Primary | ICD-10-CM

## 2025-01-09 LAB
ALBUMIN SERPL BCP-MCNC: 4.3 G/DL (ref 3.4–5)
ANION GAP SERPL CALC-SCNC: 14 MMOL/L (ref 10–20)
BUN SERPL-MCNC: 20 MG/DL (ref 6–23)
CALCIUM SERPL-MCNC: 9.7 MG/DL (ref 8.6–10.6)
CHLORIDE SERPL-SCNC: 105 MMOL/L (ref 98–107)
CO2 SERPL-SCNC: 26 MMOL/L (ref 21–32)
CREAT SERPL-MCNC: 0.8 MG/DL (ref 0.5–1.05)
EGFRCR SERPLBLD CKD-EPI 2021: 76 ML/MIN/1.73M*2
EST. AVERAGE GLUCOSE BLD GHB EST-MCNC: 128 MG/DL
GLUCOSE SERPL-MCNC: 81 MG/DL (ref 74–99)
HBA1C MFR BLD: 6.1 %
PHOSPHATE SERPL-MCNC: 3 MG/DL (ref 2.5–4.9)
POTASSIUM SERPL-SCNC: 4.3 MMOL/L (ref 3.5–5.3)
SODIUM SERPL-SCNC: 141 MMOL/L (ref 136–145)
T4 FREE SERPL-MCNC: 1.12 NG/DL (ref 0.78–1.48)
TSH SERPL-ACNC: 0.31 MIU/L (ref 0.44–3.98)

## 2025-01-09 PROCEDURE — 1159F MED LIST DOCD IN RCRD: CPT | Performed by: STUDENT IN AN ORGANIZED HEALTH CARE EDUCATION/TRAINING PROGRAM

## 2025-01-09 PROCEDURE — 1036F TOBACCO NON-USER: CPT | Performed by: STUDENT IN AN ORGANIZED HEALTH CARE EDUCATION/TRAINING PROGRAM

## 2025-01-09 PROCEDURE — 82570 ASSAY OF URINE CREATININE: CPT

## 2025-01-09 PROCEDURE — 83036 HEMOGLOBIN GLYCOSYLATED A1C: CPT

## 2025-01-09 PROCEDURE — 99214 OFFICE O/P EST MOD 30 MIN: CPT | Performed by: STUDENT IN AN ORGANIZED HEALTH CARE EDUCATION/TRAINING PROGRAM

## 2025-01-09 PROCEDURE — 84439 ASSAY OF FREE THYROXINE: CPT

## 2025-01-09 PROCEDURE — 80069 RENAL FUNCTION PANEL: CPT

## 2025-01-09 PROCEDURE — 84443 ASSAY THYROID STIM HORMONE: CPT

## 2025-01-09 PROCEDURE — 3080F DIAST BP >= 90 MM HG: CPT | Performed by: STUDENT IN AN ORGANIZED HEALTH CARE EDUCATION/TRAINING PROGRAM

## 2025-01-09 PROCEDURE — 3077F SYST BP >= 140 MM HG: CPT | Performed by: STUDENT IN AN ORGANIZED HEALTH CARE EDUCATION/TRAINING PROGRAM

## 2025-01-09 PROCEDURE — 82043 UR ALBUMIN QUANTITATIVE: CPT

## 2025-01-09 RX ORDER — IBUPROFEN 200 MG
CAPSULE ORAL
Qty: 100 STRIP | Refills: 5 | Status: SHIPPED | OUTPATIENT
Start: 2025-01-09

## 2025-01-09 RX ORDER — LANCETS
EACH MISCELLANEOUS
Qty: 100 EACH | Refills: 2 | Status: SHIPPED | OUTPATIENT
Start: 2025-01-09

## 2025-01-09 NOTE — PATIENT INSTRUCTIONS
Get your labs done     Bring your meter and supplies so the nurses can teach you how to check your sugars    Follow up in 6 months    Maryjo Samano MD  Divison of Endocrinology   [facility]   Phone: [phone]    option 4, then option 1  Fax: [phone]

## 2025-01-09 NOTE — PROGRESS NOTES
78F PMH: MNG, s/p fall with right rotator cuff injury, HTN, Obesity, CKD     Following up regarding her thyroid and DM2, previously seen Dr. Forrest     1) Known history of MNG dating as far back as 2014 2018 had an FNA of 2 left nodules, benign  2019 1.2cm complex nodule on the right, FNA benign follicular nodule     Last ultrasound was done in 7/2023  Showing stability in nodules compared to 2022   They did mention a left 1A cervical LN  Ultrasound in 2024 showing suspicious nodule left nodule 2cm not clearly identified previously  FNA in left inferior nodule 2024-benign     2) Osteopenia   DXA in 2023 showing osteopenia left femur and spine but FRAX sore not elevated     3) DM2   Lab Results   Component Value Date    HGBA1C 6.3 (H) 07/09/2024     Newly diagnosed in 2024  Not on meds  We sent for nutritionist referral   On losartan/thiazide    Activity-walks 3 miles 3x/day     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    Hyperopia of both eyes with astigmatism and presbyopia    Other conditions influencing health status     Arthritis    Personal history of other diseases of the circulatory system     History of cardiac murmur    Personal history of other endocrine, nutritional and metabolic disease     History of thyroid disease    Pure hypercholesterolemia, unspecified     High cholesterol     Family History   Problem Relation Name Age of Onset    Diabetes Sister      Diabetes Brother      Diabetes Sibling       Social History     Socioeconomic History    Marital status:      Spouse name: Not on file    Number of children: Not on file    Years of education: Not on file    Highest education level: Not on file   Occupational History    Not on file   Tobacco Use    Smoking status: Former     Types: Cigarettes      Passive exposure: Past    Smokeless tobacco: Never   Vaping Use    Vaping status: Never Used   Substance and Sexual Activity    Alcohol use: Not Currently    Drug use: Never    Sexual activity: Not Currently   Other Topics Concern    Not on file   Social History Narrative    Not on file     Social Drivers of Health     Financial Resource Strain: Not on file   Food Insecurity: Unknown (2/6/2024)    [facility]    Hunger Vital Sign     Worried About Running Out of Food in the Last Year: Never true     Ran Out of Food in the Last Year: Not on file   Transportation Needs: Not on file   Physical Activity: Not on file   Stress: Not on file   Social Connections: Not on file   Intimate Partner Violence: Not on file   Housing Stability: Not on file     ROS reviewed and is negative except for pertinent findings noted on HPI    Physical Exam  Constitutional:       Appearance: Normal appearance.   Neck:      Comments: Goiter, nodular gland   Abdominal:      Comments: Abdominal obesity   Musculoskeletal:      Comments: Walks with walker   Skin:     General: Skin is warm and dry.   Neurological:      General: No focal deficit present.      Mental Status: She is alert and oriented to person, place, and time.       labs and imaging reviewed, pertinent findings listed on HPI and Impression    Problem List Items Addressed This Visit       Osteopenia    Thyroid nodule    Relevant Orders    Thyroid Stimulating Hormone    Thyroxine, Free    Type 2 diabetes mellitus with hyperglycemia, without long-term current use of insulin - Primary    Relevant Medications    lancets (Lancets,Ultra Thin) misc    blood sugar diagnostic (Blood Glucose Test) strip    Other Relevant Orders    Albumin-Creatinine Ratio, Urine Random    Hemoglobin A1C    Renal Function Panel       1) MNG   Most recent FNA was benign, can further relax surveillance   -ultrasound in 2026    2) Osteopenia  repeat DEXA in 12/2025  Vitamin D  5000 units daily     3) DM2  Not on any meds   Physically active  Recheck labs now if A1c is higher   Interested in GLP1, can do trulicity or mounjaro since she is insulin naive.  Discussed s/e  Discuss statin next visit     Follow up in 6 months

## 2025-01-10 LAB
CREAT UR-MCNC: 108.9 MG/DL (ref 20–320)
MICROALBUMIN UR-MCNC: 53.1 MG/L
MICROALBUMIN/CREAT UR: 48.8 UG/MG CREAT

## 2025-01-17 ENCOUNTER — TELEPHONE (OUTPATIENT)
Dept: ENDOCRINOLOGY | Facility: HOSPITAL | Age: 80
End: 2025-01-17
Payer: MEDICARE

## 2025-01-17 NOTE — TELEPHONE ENCOUNTER
----- Message from Maryjo Samano sent at 1/15/2025  9:53 PM EST -----  Please let patient know A1c is staying in range so she does not need to start diabetes medications.  Thyroid labs are borderline abnormal so this is something we will need to monitor in the future.  We will also need urine monitoring

## 2025-01-22 ENCOUNTER — APPOINTMENT (OUTPATIENT)
Dept: PRIMARY CARE | Facility: CLINIC | Age: 80
End: 2025-01-22
Payer: MEDICARE

## 2025-01-22 ENCOUNTER — APPOINTMENT (OUTPATIENT)
Dept: OTOLARYNGOLOGY | Facility: HOSPITAL | Age: 80
End: 2025-01-22
Payer: MEDICARE

## 2025-02-13 ENCOUNTER — APPOINTMENT (OUTPATIENT)
Dept: CARDIOLOGY | Facility: HOSPITAL | Age: 80
End: 2025-02-13
Payer: MEDICARE

## 2025-02-13 DIAGNOSIS — I10 HYPERTENSION, UNSPECIFIED TYPE: Primary | ICD-10-CM

## 2025-02-24 PROBLEM — R82.90 ABNORMAL URINE: Status: RESOLVED | Noted: 2023-04-03 | Resolved: 2025-02-24

## 2025-02-24 PROBLEM — E66.813 CLASS 3 SEVERE OBESITY WITHOUT SERIOUS COMORBIDITY WITH BODY MASS INDEX (BMI) OF 40.0 TO 44.9 IN ADULT: Status: RESOLVED | Noted: 2023-04-03 | Resolved: 2025-02-24

## 2025-02-24 PROBLEM — R11.0 NAUSEA IN ADULT: Status: RESOLVED | Noted: 2023-04-03 | Resolved: 2025-02-24

## 2025-02-24 PROBLEM — K59.00 CONSTIPATION: Status: RESOLVED | Noted: 2023-04-03 | Resolved: 2025-02-24

## 2025-02-24 PROBLEM — R19.8 RECTAL PRESSURE: Status: RESOLVED | Noted: 2023-04-03 | Resolved: 2025-02-24

## 2025-02-24 PROBLEM — M85.80 OSTEOPENIA: Status: RESOLVED | Noted: 2023-04-03 | Resolved: 2025-02-24

## 2025-02-24 PROBLEM — Z86.19 HISTORY OF HERPES GENITALIS: Status: RESOLVED | Noted: 2023-04-03 | Resolved: 2025-02-24

## 2025-02-24 PROBLEM — R73.9 HYPERGLYCEMIA: Status: RESOLVED | Noted: 2023-04-03 | Resolved: 2025-02-24

## 2025-02-24 PROBLEM — E66.01 CLASS 3 SEVERE OBESITY WITHOUT SERIOUS COMORBIDITY WITH BODY MASS INDEX (BMI) OF 40.0 TO 44.9 IN ADULT: Status: RESOLVED | Noted: 2023-04-03 | Resolved: 2025-02-24

## 2025-02-24 PROBLEM — Z86.39 HISTORY OF DIABETES MELLITUS, TYPE II: Status: RESOLVED | Noted: 2023-04-03 | Resolved: 2025-02-24

## 2025-02-24 PROBLEM — R15.9 INCONTINENCE OF FECES: Status: RESOLVED | Noted: 2023-04-03 | Resolved: 2025-02-24

## 2025-02-24 PROBLEM — R73.03 PRE-DIABETES: Status: RESOLVED | Noted: 2023-04-03 | Resolved: 2025-02-24

## 2025-02-24 PROBLEM — L02.91 ABSCESS: Status: RESOLVED | Noted: 2023-04-03 | Resolved: 2025-02-24

## 2025-02-24 PROBLEM — A60.00 GENITAL HERPES SIMPLEX: Status: RESOLVED | Noted: 2023-12-06 | Resolved: 2025-02-24

## 2025-02-24 PROBLEM — R11.0 NAUSEA: Status: RESOLVED | Noted: 2024-12-12 | Resolved: 2025-02-24
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
## 2025-02-24 PROBLEM — Z20.822 EXPOSURE TO COVID-19 VIRUS: Status: RESOLVED | Noted: 2023-04-03 | Resolved: 2025-02-24

## 2025-02-24 PROBLEM — R19.8 ABNORMAL BOWEL HABITS: Status: RESOLVED | Noted: 2023-04-03 | Resolved: 2025-02-24

## 2025-02-24 PROBLEM — E16.2 HYPOGLYCEMIA: Status: RESOLVED | Noted: 2023-04-03 | Resolved: 2025-02-24

## 2025-02-24 PROBLEM — E11.9 TYPE 2 DIABETES MELLITUS WITHOUT RETINOPATHY (MULTI): Status: RESOLVED | Noted: 2024-01-24 | Resolved: 2025-02-24

## 2025-02-24 PROBLEM — E07.9 THYROID DISORDER: Status: RESOLVED | Noted: 2023-04-03 | Resolved: 2025-02-24

## 2025-02-27 ENCOUNTER — APPOINTMENT (OUTPATIENT)
Dept: CARDIOLOGY | Facility: HOSPITAL | Age: 80
End: 2025-02-27
Payer: MEDICARE

## 2025-03-12 ENCOUNTER — OFFICE VISIT (OUTPATIENT)
Dept: ORTHOPEDIC SURGERY | Facility: CLINIC | Age: 80
End: 2025-03-12
Payer: MEDICARE

## 2025-03-12 DIAGNOSIS — M19.011 ARTHRITIS OF RIGHT SHOULDER REGION: ICD-10-CM

## 2025-03-12 DIAGNOSIS — G89.29 CHRONIC RIGHT SHOULDER PAIN: Primary | ICD-10-CM

## 2025-03-12 DIAGNOSIS — M25.511 CHRONIC RIGHT SHOULDER PAIN: Primary | ICD-10-CM

## 2025-03-12 PROCEDURE — 99212 OFFICE O/P EST SF 10 MIN: CPT | Performed by: STUDENT IN AN ORGANIZED HEALTH CARE EDUCATION/TRAINING PROGRAM

## 2025-03-12 NOTE — PROGRESS NOTES
CHIEF COMPLAINT:   No chief complaint on file.    History: 79 y.o. female returns the office today for follow-up of her right shoulder.  We gave her an injection about 4 months ago.  She says it is actually still feeling well.    11/13/24: returns to the office for follow-up of her right shoulder.  She has known shoulder arthritis.  We gave her an injection about 5 months ago.  Unfortunately it is starting to wear off.    9/3/24: returns the office today for follow-up of her right shoulder.  She has known shoulder arthritis.  We gave her injection 3 months ago.  He is doing very well.  In fact, she really is not having much pain today.  Able to use the shoulder as tolerated.    6/4/24: presents to the office today for evaluation of her right shoulder.  The patient is right-hand dominant.  She is no longer working.  She states that she actually injured her shoulder at her previous job about a year and a half ago when she fell directly onto the right shoulder.  Since that time she has had extensive physical therapy.  She actually is in therapy again has not been in therapy for more than 3 months at this point.  Continues to have pain and mechanical symptoms of the right shoulder.  States that the shoulder locks sometimes.  Not had any injections.  Pain is really deep and anterior.  Also has some cervical complaints today.      Past medical history, past surgical history, medications, allergies, family history, social history, and review of systems were reviewed today.    A 12 point review of systems was negative other than as stated in the HPI.    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    Hyperopia of both eyes with astigmatism and presbyopia    Other conditions influencing health status     Arthritis    Personal history  of other diseases of the circulatory system     History of cardiac murmur    Personal history of other endocrine, nutritional and metabolic disease     History of thyroid disease    Pure hypercholesterolemia, unspecified     High cholesterol        Allergies   Allergen Reactions    Iodinated Contrast Media Anaphylaxis    Propoxyphene Unknown, Anaphylaxis and Other    Propoxyphene Hcl Anaphylaxis    Barium Sulfate Unknown and Other    Lisinopril Other and GI Upset     Gastrointestinal upset        Past Surgical History:   Procedure Laterality Date    MR HEAD ANGIO WO IV CONTRAST  12/27/2012    MR HEAD ANGIO WO IV CONTRAST 12/27/2012 [facility] ANCILLARY LEGAC[facility]    OTHER SURGICAL HISTORY  09/08/2014    History Of Prior Surgery    TONSILLECTOMY  08/10/2015    Tonsillectomy        Family History   Problem Relation Name Age of Onset    Diabetes Sister      Diabetes Brother      Diabetes Sibling          Social History     Socioeconomic History    Marital status:      Spouse name: Not on file    Number of children: Not on file    Years of education: Not on file    Highest education level: Not on file   Occupational History    Not on file   Tobacco Use    Smoking status: Former     Types: Cigarettes     Passive exposure: Past    Smokeless tobacco: Never   Vaping Use    Vaping status: Never Used   Substance and Sexual Activity    Alcohol use: Not Currently    Drug use: Never    Sexual activity: Not Currently   Other Topics Concern    Not on file   Social History Narrative    Not on file     Social Drivers of Health     Financial Resource Strain: Not on file   Food Insecurity: Unknown (2/6/2024)    [facility]    Hunger Vital Sign     Worried About Running Out of Food in the Last Year: Never true     Ran Out of Food in the Last Year: Not on file   Transportation Needs: Not on file   Physical Activity: Not on file   Stress: Not on file   Social Connections: Not on file   Intimate Partner  Violence: Not on file   Housing Stability: Not on file        CURRENT MEDICATIONS:   Current Outpatient Medications   Medication Sig Dispense Refill    acetaminophen (Tylenol 8 Hour) 650 mg ER tablet Take by mouth. Use as directed (Patient not taking: Reported on 12/12/2024)      ascorbic acid (Vitamin C) 1,000 mg tablet Take 1 tablet (1,000 mg) by mouth once daily.      aspirin 81 mg EC tablet Take 1 tablet (81 mg) by mouth once daily. As directed      Autolet (OneTouch Delica Plus Lanc Dev) lancing device For daily BG check 1 each 0    blood sugar diagnostic (Blood Glucose Test) strip For daily BG check 100 strip 5    blood-glucose meter misc For blood sugar checks 1 each 0    calcium carb/vitamin D3/vit K1 (CALCIUM SOFT CHEW ORAL) Take 1 capsule by mouth once daily.      calcium carbonate 600 mg calcium (1,500 mg) tablet Take 1 tablet (600 mg) by mouth once daily. (Patient not taking: Reported on 12/12/2024)      cholecalciferol (Vitamin D3) 5,000 Units tablet Take 1 tablet (5,000 Units) by mouth once daily. 90 tablet 3    cyclobenzaprine (Flexeril) 10 mg tablet Take 1 tablet (10 mg) by mouth as needed at bedtime for muscle spasms. 60 tablet 0    famotidine (Pepcid) 40 mg tablet TAKE 1 TABLET(40 MG) BY MOUTH TWICE DAILY 180 tablet 2    fluticasone (Flonase) 50 mcg/actuation nasal spray Administer 1 spray into each nostril once daily. 16 g 2    lancets (Lancets,Ultra Thin) misc For daily BG checks 100 each 2    losartan-hydrochlorothiazide (Hyzaar) 50-12.5 mg tablet Take 1 tablet by mouth once daily. 90 tablet 1    multivitamin (One Daily Multivitamin) tablet Take 1 tablet by mouth once daily.      NON FORMULARY Take 1 each by mouth once daily. Probiotic green tea      oxymetazoline (Afrin) 0.05 % nasal spray Administer 2 sprays into each nostril every 12 hours if needed (for nose bleeding) for up to 2 days. Do not use for more than 3 days. 30 mL 0    psyllium (Metamucil) 3.4 gram packet Take 1 packet by mouth  "once daily. Mix and drink with at least 8 ounces of water or juice. (Patient not taking: Reported on 12/12/2024) 30 packet 3    sodium chloride (Ocean) 0.65 % nasal spray Administer 1 spray into each nostril if needed for congestion (and dry nose). 30 mL 12    sodium chloride-Aloe vera gel (Ayr Saline) gel topical gel Apply 1 Application to affected nostril(s) if needed (dry nose). (Patient not taking: Reported on 12/12/2024) 14.1 g 2    zinc sulfate (Zincate) 220 (50 Zn) MG capsule Take 1 capsule (50 mg of elemental zinc) by mouth once daily.       No current facility-administered medications for this visit.       Physical Examination:      8/22/2024     3:20 PM 8/22/2024     3:40 PM 9/19/2024     2:47 PM 10/23/2024    11:44 AM 10/30/2024     2:04 PM 12/12/2024     2:29 PM 1/9/2025     1:20 PM   Vitals   Systolic 155 157 165  118 144 153   Diastolic 69 70 76  75 92 95   BP Location   Left arm    Right arm   Heart Rate 65 66 64  75 84 73   Temp     36.3 °C (97.4 °F) 36.6 °C (97.9 °F) 36.4 °C (97.5 °F)   Resp 15 16   16 28    Height   1.575 m (5' 2\") 1.575 m (5' 2\") 1.575 m (5' 2\")  1.575 m (5' 2\")   Weight (lb)   244.2 241 242 243.2 244   BMI   44.66 kg/m2 44.08 kg/m2 44.26 kg/m2 44.48 kg/m2 44.63 kg/m2   BSA (m2)   2.2 m2 2.18 m2 2.19 m2 2.19 m2 2.2 m2   Visit Report   Report Report Report Report Report      There is no height or weight on file to calculate BMI.    Well-appearing, appears stated age, pleasant and cooperative, appropriate mood and behavior. Height and weight reviewed. Alert and oriented x3.  Auditory function intact.  No acute distress.  Intact ocular function, LILIYA, EOMI. Breathing is unlabored .  There is no evidence of jugular venous distension. Skin appearance is normal without evidence of rash or other lesions. 2+ radial pulses bilaterally, fingers pink and wwp, good capillary refill, no pitting edema. No appreciable lymphadenopathy in bilateral upper extremities. SILT throughout both upper " extremities, median/radial/ulnar/musculocutaneous/axillary nerve motor and sensory intact (except for abnormalities noted in focused musculoskeletal exam section below).     Neck exam: Full range of motion of the neck in flexion/extension and rotational movements. No significant areas of tenderness to palpation in the neck.    On exam of bilateral upper extremities, she has she has relatively preserved range of motion of the right shoulder in forward flexion, active forward flexion 130 compared to 140 on the left, external rotation though is limited, 10 degrees on the right compared to 30 on the left.  Internal rotation to the side on the right compared to L5 on the left.  Rotator cuff strength is preserved but painful on the right side.    Imaging: Radiographs of the right shoulder were reviewed today.  First interpreted by myself.  Findings consistent with glenohumeral arthritis with loss of glenohumeral joint space and osteophyte formation.  Humeral head is centered on the axillary view.    Assessment: Right shoulder osteoarthritis    Plan: Patient's symptoms, exam and imaging findings are consistent with end stage glenohumeral osteoarthritis.  We discussed the natural history of this.  We discussed that with shoulder osteoarthritis, there is loss of cartilage on both the humerus and glenoid, bony erosion of the glenoid, and extra bone formation called osteophytes on the humerus and glenoid.  We discussed that there are different patterns of arthritis.  Conservative treatment for glenohumeral arthritis is limited, and can include activity modification, injections and physical therapy, although with end stage bone on bone arthritis the benefits of physical therapy may be limited. The final option would be to have a shoulder replacement. After discussion, the patient responded well to the cortisone injection and it is still working.  She will come back in a few months for repeat injection.            Mary  software was used to dictate this note, please be aware that minor errors in transcription may be present.    Glen Karimi MD    Shoulder/Elbow Surgery  [facility]

## 2025-03-17 ENCOUNTER — OFFICE VISIT (OUTPATIENT)
Dept: CARDIOLOGY | Facility: HOSPITAL | Age: 80
End: 2025-03-17
Payer: MEDICARE

## 2025-03-17 VITALS
DIASTOLIC BLOOD PRESSURE: 77 MMHG | WEIGHT: 242.4 LBS | HEIGHT: 62 IN | BODY MASS INDEX: 44.61 KG/M2 | OXYGEN SATURATION: 100 % | SYSTOLIC BLOOD PRESSURE: 116 MMHG | HEART RATE: 76 BPM

## 2025-03-17 DIAGNOSIS — E78.2 MODERATE MIXED HYPERLIPIDEMIA NOT REQUIRING STATIN THERAPY: ICD-10-CM

## 2025-03-17 DIAGNOSIS — R06.09 DYSPNEA ON EXERTION: ICD-10-CM

## 2025-03-17 DIAGNOSIS — I10 HYPERTENSION, UNSPECIFIED TYPE: Primary | ICD-10-CM

## 2025-03-17 PROCEDURE — 3078F DIAST BP <80 MM HG: CPT | Performed by: STUDENT IN AN ORGANIZED HEALTH CARE EDUCATION/TRAINING PROGRAM

## 2025-03-17 PROCEDURE — 1126F AMNT PAIN NOTED NONE PRSNT: CPT | Performed by: STUDENT IN AN ORGANIZED HEALTH CARE EDUCATION/TRAINING PROGRAM

## 2025-03-17 PROCEDURE — 93005 ELECTROCARDIOGRAM TRACING: CPT | Performed by: STUDENT IN AN ORGANIZED HEALTH CARE EDUCATION/TRAINING PROGRAM

## 2025-03-17 PROCEDURE — 3074F SYST BP LT 130 MM HG: CPT | Performed by: STUDENT IN AN ORGANIZED HEALTH CARE EDUCATION/TRAINING PROGRAM

## 2025-03-17 PROCEDURE — 99214 OFFICE O/P EST MOD 30 MIN: CPT | Performed by: STUDENT IN AN ORGANIZED HEALTH CARE EDUCATION/TRAINING PROGRAM

## 2025-03-17 PROCEDURE — 1036F TOBACCO NON-USER: CPT | Performed by: STUDENT IN AN ORGANIZED HEALTH CARE EDUCATION/TRAINING PROGRAM

## 2025-03-17 PROCEDURE — 1159F MED LIST DOCD IN RCRD: CPT | Performed by: STUDENT IN AN ORGANIZED HEALTH CARE EDUCATION/TRAINING PROGRAM

## 2025-03-17 RX ORDER — SIMVASTATIN 20 MG/1
20 TABLET, FILM COATED ORAL NIGHTLY
Qty: 90 TABLET | Refills: 3 | Status: SHIPPED | OUTPATIENT
Start: 2025-03-17

## 2025-03-17 ASSESSMENT — ENCOUNTER SYMPTOMS
DYSPNEA ON EXERTION: 1
IRREGULAR HEARTBEAT: 0
DEPRESSION: 0
LOSS OF SENSATION IN FEET: 0
CLAUDICATION: 0
PND: 0
OCCASIONAL FEELINGS OF UNSTEADINESS: 1
NEAR-SYNCOPE: 0
SYNCOPE: 0
PALPITATIONS: 0
ORTHOPNEA: 0
SHORTNESS OF BREATH: 0

## 2025-03-17 ASSESSMENT — PATIENT HEALTH QUESTIONNAIRE - PHQ9
SUM OF ALL RESPONSES TO PHQ9 QUESTIONS 1 AND 2: 0
2. FEELING DOWN, DEPRESSED OR HOPELESS: NOT AT ALL
1. LITTLE INTEREST OR PLEASURE IN DOING THINGS: NOT AT ALL

## 2025-03-17 ASSESSMENT — PAIN SCALES - GENERAL: PAINLEVEL_OUTOF10: 0-NO PAIN

## 2025-03-17 NOTE — PROGRESS NOTES
[facility] - General Cardiology Note                                                                                        Reason for Visit: Follow-up visit     History of Present Illness  Celeste Murray is a 80 y.o. female with history of HTN, OVALLE, obesity, back pain, who comes in for a follow-up visit.     List of Active Cardiac Issues:  # HTN: well-controlled on losartan-hydrochlorothiazide combination.   # Obesity: BMI 42 kg/m2   # OVALLE: Most likely noncardiac because of obesity and back pain along with deconditioning.     Interval History:  Patient reports feeling well. She continues to report shortness of breath on exertion. No chest pain, LE swelling, orthopnea, or PND.     Past Medical History  She has a past medical history of Encounter for immunization (02/07/2022), Encounter for screening for malignant neoplasm of colon, Hypermetropia, bilateral (05/21/2019), Other conditions influencing health status, Personal history of other diseases of the circulatory system, Personal history of other endocrine, nutritional and metabolic disease, and Pure hypercholesterolemia, unspecified.    Past Surgical History  She has a past surgical history that includes Tonsillectomy (08/10/2015); Other surgical history (09/08/2014); and MR angio head wo IV contrast (12/27/2012).    Social History  She reports that she has quit smoking. Her smoking use included cigarettes. She has been exposed to tobacco smoke. She has never used smokeless tobacco. She reports that she does not currently use alcohol. She reports that she does not use drugs.    Family History  Family History   Problem Relation Name Age of Onset    Diabetes Sister      Diabetes Brother      Diabetes Sibling         Allergies  Iodinated contrast media, Propoxyphene, Propoxyphene hcl, Barium sulfate, and Lisinopril    Outpatient Medications  Current Outpatient Medications   Medication Instructions     acetaminophen (Tylenol 8 Hour) 650 mg ER tablet Take by mouth. Use as directed    ascorbic acid (Vitamin C) 1,000 mg tablet 1 tablet, Daily    aspirin 81 mg EC tablet 1 tablet, Daily    Autolet (OneTouch Delica Plus Lanc Dev) lancing device For daily BG check    blood sugar diagnostic (Blood Glucose Test) strip For daily BG check    blood-glucose meter misc For blood sugar checks    calcium carb/vitamin D3/vit K1 (CALCIUM SOFT CHEW ORAL) 1 capsule, Daily    calcium carbonate 600 mg, Daily RT    cholecalciferol (VITAMIN D3) 5,000 Units, oral, Daily    cyclobenzaprine (FLEXERIL) 10 mg, oral, Nightly PRN    famotidine (Pepcid) 40 mg tablet TAKE 1 TABLET(40 MG) BY MOUTH TWICE DAILY    fluticasone (Flonase) 50 mcg/actuation nasal spray 1 spray, Each Nostril, Daily RT    lancets (Lancets,Ultra Thin) misc For daily BG checks    losartan-hydrochlorothiazide (Hyzaar) 50-12.5 mg tablet 1 tablet, oral, Daily    multivitamin (One Daily Multivitamin) tablet 1 tablet, Daily    NON FORMULARY 1 each, Daily    oxymetazoline (Afrin) 0.05 % nasal spray 2 sprays, Each Nostril, Every 12 hours PRN, Do not use for more than 3 days.    psyllium (Metamucil) 3.4 gram packet 1 packet, oral, Daily, Mix and drink with at least 8 ounces of water or juice.    sodium chloride (Ocean) 0.65 % nasal spray 1 spray, Each Nostril, As needed    sodium chloride-Aloe vera gel (Ayr Saline) gel topical gel 1 Application, nasal, As needed    zinc sulfate (Zincate) 220 (50 Zn) MG capsule 50 mg of elemental zinc, Daily       Review of Systems  Review of Systems   Constitutional: Negative for malaise/fatigue.   Cardiovascular:  Positive for dyspnea on exertion. Negative for chest pain, claudication, cyanosis, irregular heartbeat, leg swelling, near-syncope, orthopnea, palpitations, paroxysmal nocturnal dyspnea and syncope.   Respiratory:  Negative for shortness of breath.    All other systems reviewed and are negative.      Last Recorded Vitals  Vitals:     "03/17/25 1450   BP: 116/77   BP Location: Left arm   Patient Position: Sitting   BP Cuff Size: Large adult   Pulse: 76   SpO2: 100%   Weight: 110 kg (242 lb 6.4 oz)   Height: 1.575 m (5' 2\")       Physical Examination  General: Well appearing, well-nourished, in no acute distress.  HEENT: Normocephalic atraumatic, pupils equal and reactive to light, extraocular muscles intact, no conjunctival injection, oropharynx clear without exudates.  Neck: Normal carotid arterial pulses, no arterial bruits, no thyromegaly.  Cardiac: Regular rhythm and normal heart rate.  S1, S2 present and normal.  No murmurs, rubs, or gallops.  PMI is nondisplaced. Jugular venous pulsations are normal.  Pulmonary: Normal breath sounds, no increased work of breathing, no wheezes or crackles.  GI: Normal bowel sounds, abdominal aorta not enlarged, no hepatosplenomegaly, no abdominal bruits.  Lower extremities: No cyanosis, clubbing, or edema.  No xanthelasma present. Normal distal pulses.  Skin: Skin intact. No significant rashes or lesions present.  Neuro: Alert and oriented x 3, normal attention and cognition, no focal motor or sensory neurologic deficits.  Psych: Normal affect and mood.  Musculoskeletal: Normal gait normal muscle tone.    Laboratory Studies  Lab Results   Component Value Date    GLUCOSE 81 01/09/2025    CALCIUM 9.7 01/09/2025     01/09/2025    K 4.3 01/09/2025    CO2 26 01/09/2025     01/09/2025    BUN 20 01/09/2025    CREATININE 0.80 01/09/2025     Lab Results   Component Value Date    ALT 27 07/03/2024    AST 21 07/03/2024    GGT 15 05/19/2021    ALKPHOS 72 07/03/2024    BILITOT 0.2 07/03/2024         Lab Results   Component Value Date    CHOL 196 02/12/2024    CHOL 209 (H) 02/27/2023    CHOL 165 06/04/2021     Lab Results   Component Value Date    HDL 68.0 02/12/2024    HDL 68.6 02/27/2023    HDL 64.1 06/04/2021     Lab Results   Component Value Date    LDLCALC 111 (H) 02/12/2024     Lab Results   Component " "Value Date    TRIG 86 02/12/2024    TRIG 114 02/27/2023    TRIG 50 06/22/2020     No components found for: \"CHOLHDL\"  Lab Results   Component Value Date    HGBA1C 6.1 (H) 01/09/2025     No components found for: \"UACR\"    Cardiology Tests  ECG:   ECG 12 lead (Clinic Performed) 12/07/2023    Echo:   Transthoracic echo (TTE) complete 08/06/2024  1. Poorly visualized anatomical structures due to suboptimal image quality.   2. Left ventricular ejection fraction is low normal, by visual estimate at 55%.   3. There is normal right ventricular global systolic function.    Cardiac Imaging:   CT cardiac scoring wo IV contrast 12/24/2024  Coronary artery calcium score of 24.43*.     I personally reviewed the imaging studies and     Assessment and Plan  Celeste Murray is a 80 y.o. female with history of HTN, OVALLE, obesity, back pain, who comes in for a follow-up visit.     List of Active Cardiac Issues:  # HTN: well-controlled on losartan-hydrochlorothiazide combination.   # Obesity: BMI 42 kg/m2   # OVALLE: Most likely noncardiac because of obesity and back pain along with deconditioning.   # HLD: counseled patient on elevated ASCVD and start on statin. She insists on re-starting simvastatin and does not want rosuvastatin.   # Counseled patient on stopping ASA for primary preventions.         Problem List Items Addressed This Visit    None  Visit Diagnoses       Hypertension, unspecified type    -  Primary                  Chava Hart MD      "

## 2025-03-20 ENCOUNTER — APPOINTMENT (OUTPATIENT)
Dept: CARDIOLOGY | Facility: HOSPITAL | Age: 80
End: 2025-03-20
Payer: MEDICARE

## 2025-03-20 LAB
ATRIAL RATE: 78 BPM
P AXIS: 48 DEGREES
P OFFSET: 185 MS
P ONSET: 138 MS
PR INTERVAL: 158 MS
Q ONSET: 217 MS
QRS COUNT: 13 BEATS
QRS DURATION: 86 MS
QT INTERVAL: 400 MS
QTC CALCULATION(BAZETT): 456 MS
QTC FREDERICIA: 436 MS
R AXIS: -14 DEGREES
T AXIS: 70 DEGREES
T OFFSET: 417 MS
VENTRICULAR RATE: 78 BPM

## 2025-04-21 ENCOUNTER — OFFICE VISIT (OUTPATIENT)
Dept: PRIMARY CARE | Facility: CLINIC | Age: 80
End: 2025-04-21
Payer: MEDICARE

## 2025-04-21 VITALS
HEART RATE: 64 BPM | HEIGHT: 62 IN | SYSTOLIC BLOOD PRESSURE: 144 MMHG | WEIGHT: 246 LBS | DIASTOLIC BLOOD PRESSURE: 83 MMHG | RESPIRATION RATE: 16 BRPM | TEMPERATURE: 97.4 F | BODY MASS INDEX: 45.27 KG/M2

## 2025-04-21 DIAGNOSIS — K21.00 GASTROESOPHAGEAL REFLUX DISEASE WITH ESOPHAGITIS WITHOUT HEMORRHAGE: Primary | ICD-10-CM

## 2025-04-21 DIAGNOSIS — Z12.11 COLON CANCER SCREENING: ICD-10-CM

## 2025-04-21 DIAGNOSIS — I10 UNCONTROLLED HYPERTENSION: ICD-10-CM

## 2025-04-21 PROCEDURE — 1126F AMNT PAIN NOTED NONE PRSNT: CPT | Performed by: FAMILY MEDICINE

## 2025-04-21 PROCEDURE — 99214 OFFICE O/P EST MOD 30 MIN: CPT | Performed by: FAMILY MEDICINE

## 2025-04-21 PROCEDURE — G2211 COMPLEX E/M VISIT ADD ON: HCPCS | Performed by: FAMILY MEDICINE

## 2025-04-21 PROCEDURE — 3077F SYST BP >= 140 MM HG: CPT | Performed by: FAMILY MEDICINE

## 2025-04-21 PROCEDURE — 1159F MED LIST DOCD IN RCRD: CPT | Performed by: FAMILY MEDICINE

## 2025-04-21 PROCEDURE — 3079F DIAST BP 80-89 MM HG: CPT | Performed by: FAMILY MEDICINE

## 2025-04-21 PROCEDURE — 1036F TOBACCO NON-USER: CPT | Performed by: FAMILY MEDICINE

## 2025-04-21 RX ORDER — FAMOTIDINE 40 MG/1
40 TABLET, FILM COATED ORAL 2 TIMES DAILY
Qty: 180 TABLET | Refills: 2 | Status: SHIPPED | OUTPATIENT
Start: 2025-04-21

## 2025-04-21 RX ORDER — LOSARTAN POTASSIUM AND HYDROCHLOROTHIAZIDE 12.5; 5 MG/1; MG/1
1 TABLET ORAL DAILY
Qty: 90 TABLET | Refills: 1 | Status: SHIPPED | OUTPATIENT
Start: 2025-04-21

## 2025-04-21 ASSESSMENT — ENCOUNTER SYMPTOMS
OCCASIONAL FEELINGS OF UNSTEADINESS: 1
DEPRESSION: 0
LOSS OF SENSATION IN FEET: 0

## 2025-04-21 ASSESSMENT — PAIN SCALES - GENERAL: PAINLEVEL_OUTOF10: 0-NO PAIN

## 2025-04-21 ASSESSMENT — PATIENT HEALTH QUESTIONNAIRE - PHQ9: 2. FEELING DOWN, DEPRESSED OR HOPELESS: NOT AT ALL

## 2025-04-21 NOTE — PROGRESS NOTES
"Subjective   Patient ID: Celeste Murray is a 80 y.o. who presents for Follow-up.  HPI    Elevated ASCVD risk  - Has been taking simvastatin consistently for 2 months     Shoulder pain  - Following with ortho. Continue to have issues with pain and mobility     HTN  - No headaches, chest pain, shortness of breath, vision changes, dizziness  - Taking losartan-hydrochlorothiazide without issue  - Blood pressure stable  - Creatinine stable    DM  - Follows with endocrine. A1C stable   - Was looking into GLP1, however A1C was low  - Has been working on exercising     GERD  - Following with GI. Was recommended pepcid, but she never picked this up. She continues to have significant symptoms    Due for colonoscopy in August     Review of Systems  10 point review of systems negative except as noted in HPI.    Objective     /83   Pulse 64   Temp 36.3 °C (97.4 °F)   Resp 16   Ht 1.575 m (5' 2\")   Wt 112 kg (246 lb)   BMI 44.99 kg/m²   General: well appearing, no distress  CV: Regular rate and rhythm, no murmur  Lungs: Clear to auscultation bilaterally  Extremities: No edema noted, wearing compression stockings   Psych: Appropriate mood and affect     Assessment/Plan   78 yo here for follow-up    GERD  - Recommended H2 blocker     Hypertension  - Continue losartan-hydrochlorothiazide  - Elevated ASCVD risk- continue simvastatin. Check lipids in 4 months (after consistently taking statin    Shoulder pain  - Follow-up ortho    Colonoscopy due in August- ordered    RTC 4 months or sooner as needed   "

## 2025-04-23 ENCOUNTER — APPOINTMENT (OUTPATIENT)
Dept: OTOLARYNGOLOGY | Facility: HOSPITAL | Age: 80
End: 2025-04-23
Payer: MEDICARE

## 2025-05-01 NOTE — PATIENT INSTRUCTIONS
1) Please schedule and complete EGD and colonoscopy  2) Please take pantoprazole for 1-2 months       If you have any questions or need assistance, please don't hesitate to contact us.     Our offices are located at [facility].  Please use the numbers below when calling.    Post:         Office [phone] Fax: [phone]  Hepatology Nurse Coordinator:         Janine SALGADO [phone]     FOR SCHEDULING  You will get a notification through [employer] or a phone call to help you schedule all your tests. If you prefer, feel free to call the main scheduling number to set up any tests you need.    Main Scheduling Number: [phone]  (See below for department name when scheduling)

## 2025-05-07 ENCOUNTER — OFFICE VISIT (OUTPATIENT)
Dept: GASTROENTEROLOGY | Facility: HOSPITAL | Age: 80
End: 2025-05-07
Payer: MEDICARE

## 2025-05-07 VITALS
DIASTOLIC BLOOD PRESSURE: 89 MMHG | RESPIRATION RATE: 18 BRPM | WEIGHT: 241 LBS | BODY MASS INDEX: 44.08 KG/M2 | TEMPERATURE: 97.7 F | SYSTOLIC BLOOD PRESSURE: 141 MMHG | OXYGEN SATURATION: 97 % | HEART RATE: 71 BPM

## 2025-05-07 DIAGNOSIS — K21.9 GASTROESOPHAGEAL REFLUX DISEASE, UNSPECIFIED WHETHER ESOPHAGITIS PRESENT: Primary | ICD-10-CM

## 2025-05-07 DIAGNOSIS — Z86.0101 ENCOUNTER FOR COLONOSCOPY DUE TO HISTORY OF ADENOMATOUS COLONIC POLYPS: ICD-10-CM

## 2025-05-07 DIAGNOSIS — Z12.11 ENCOUNTER FOR COLONOSCOPY DUE TO HISTORY OF ADENOMATOUS COLONIC POLYPS: ICD-10-CM

## 2025-05-07 PROCEDURE — 99213 OFFICE O/P EST LOW 20 MIN: CPT | Performed by: INTERNAL MEDICINE

## 2025-05-07 PROCEDURE — 3079F DIAST BP 80-89 MM HG: CPT | Performed by: INTERNAL MEDICINE

## 2025-05-07 PROCEDURE — 1159F MED LIST DOCD IN RCRD: CPT | Performed by: INTERNAL MEDICINE

## 2025-05-07 PROCEDURE — 1126F AMNT PAIN NOTED NONE PRSNT: CPT | Performed by: INTERNAL MEDICINE

## 2025-05-07 PROCEDURE — 3077F SYST BP >= 140 MM HG: CPT | Performed by: INTERNAL MEDICINE

## 2025-05-07 RX ORDER — PANTOPRAZOLE SODIUM 40 MG/1
40 TABLET, DELAYED RELEASE ORAL DAILY
Qty: 30 TABLET | Refills: 1 | Status: SHIPPED | OUTPATIENT
Start: 2025-05-07 | End: 2026-05-07

## 2025-05-07 RX ORDER — SODIUM, POTASSIUM,MAG SULFATES 17.5-3.13G
1 SOLUTION, RECONSTITUTED, ORAL ORAL ONCE
Status: DISPENSED | OUTPATIENT
Start: 2025-05-07

## 2025-05-07 ASSESSMENT — PAIN SCALES - GENERAL: PAINLEVEL_OUTOF10: 0-NO PAIN

## 2025-05-07 NOTE — PROGRESS NOTES
Outpatient Hepatology Progress Note     Name: Celeste Murray  : 1945  MRN: [id number]  Date: 2025     History of Present Illness:  This is a 80-year-old female presenting for evaluation of persistent GERD symptoms and for referral for screening colonoscopy. She reports ongoing postprandial bloating, acid reflux, and an acidic taste in the mouth, particularly when lying supine. She was started on famotidine BID for severe heartburn initially rated 8/10; after 2 weeks, symptoms have improved to 5/10. She also endorses intermittent shortness of breath associated with reflux but otherwise feels well. Her last colonoscopy was 5 years ago and revealed a 4 mm tubular adenoma in the transverse colon. A prior colonoscopy 12 years ago also showed a 5 mm tubular adenoma.    Review of Systems:  As per HPI.     Past Medical History:  Medical History[1]    Past Surgical History:  Surgical History[2]    Family History:  Family History[3]     Medications:  Current Outpatient Medications   Medication Instructions    acetaminophen (Tylenol 8 Hour) 650 mg ER tablet Take by mouth. Use as directed    ascorbic acid (Vitamin C) 1,000 mg tablet 1 tablet, Daily    Autolet (OneTouch Delica Plus Lanc Dev) lancing device For daily BG check    blood sugar diagnostic (Blood Glucose Test) strip For daily BG check    blood-glucose meter misc For blood sugar checks    calcium carb/vitamin D3/vit K1 (CALCIUM SOFT CHEW ORAL) 1 capsule, Daily    calcium carbonate 600 mg, Daily RT    cholecalciferol (VITAMIN D3) 5,000 Units, oral, Daily    cyclobenzaprine (FLEXERIL) 10 mg, oral, Nightly PRN    famotidine (PEPCID) 40 mg, oral, 2 times daily    fluticasone (Flonase) 50 mcg/actuation nasal spray 1 spray, Each Nostril, Daily RT    lancets (Lancets,Ultra Thin) misc For daily BG checks    losartan-hydrochlorothiazide (Hyzaar) 50-12.5 mg tablet 1 tablet, oral, Daily    multivitamin (One Daily Multivitamin) tablet 1 tablet, Daily    NON FORMULARY 1  each, Daily    oxymetazoline (Afrin) 0.05 % nasal spray 2 sprays, Each Nostril, Every 12 hours PRN, Do not use for more than 3 days.    pantoprazole (PROTONIX) 40 mg, oral, Daily, Do not crush, chew, or split.    psyllium (Metamucil) 3.4 gram packet 1 packet, oral, Daily, Mix and drink with at least 8 ounces of water or juice.    simvastatin (ZOCOR) 20 mg, oral, Nightly    sodium chloride (Ocean) 0.65 % nasal spray 1 spray, Each Nostril, As needed    sodium chloride-Aloe vera gel (Ayr Saline) gel topical gel 1 Application, nasal, As needed    zinc sulfate (Zincate) 220 (50 Zn) MG capsule 50 mg of elemental zinc, Daily        Allergies:  RX Allergies[4]      Physical Exam:  Physical Exam  Vitals reviewed.   Constitutional:       General: She is not in acute distress.     Appearance: Normal appearance.   HENT:      Head: Normocephalic and atraumatic.      Right Ear: External ear normal.      Left Ear: External ear normal.      Nose: Nose normal.      Mouth/Throat:      Mouth: Mucous membranes are dry.   Eyes:      Extraocular Movements: Extraocular movements intact.      Pupils: Pupils are equal, round, and reactive to light.   Cardiovascular:      Rate and Rhythm: Normal rate and regular rhythm.      Pulses: Normal pulses.      Heart sounds: Normal heart sounds.   Pulmonary:      Effort: Pulmonary effort is normal.      Breath sounds: Normal breath sounds.   Abdominal:      General: Bowel sounds are normal.      Palpations: Abdomen is soft.   Musculoskeletal:         General: Normal range of motion.      Cervical back: Normal range of motion and neck supple.   Skin:     General: Skin is warm and dry.   Neurological:      General: No focal deficit present.      Mental Status: She is alert and oriented to person, place, and time.   Psychiatric:         Mood and Affect: Mood normal.         Behavior: Behavior normal.         Thought Content: Thought content normal.         Judgment: Judgment normal.        Vitals:  Vitals:    05/07/25 1044   BP: 141/89   Pulse: 71   Resp: 18   Temp: 36.5 °C (97.7 °F)   SpO2: 97%        Labs:  Lab Results   Component Value Date    WBC 6.5 07/03/2024    HGB 12.1 07/03/2024    HCT 36.5 07/03/2024    MCV 92 07/03/2024     07/03/2024     Lab Results   Component Value Date    GLUCOSE 81 01/09/2025    CALCIUM 9.7 01/09/2025     01/09/2025    K 4.3 01/09/2025    CO2 26 01/09/2025     01/09/2025    BUN 20 01/09/2025    CREATININE 0.80 01/09/2025     Lab Results   Component Value Date    ALT 27 07/03/2024    AST 21 07/03/2024    GGT 15 05/19/2021    ALKPHOS 72 07/03/2024    BILITOT 0.2 07/03/2024       Assessment and Plan:  80-year-old female presenting with persistent GERD symptoms including postprandial bloating, acid reflux, and retrosternal burning, partially responsive to 2 weeks of famotidine BID. Symptoms remain bothersome (rated 5/10), particularly when lying supine, and are associated with acidic taste and intermittent dyspnea. Presentation is consistent with ongoing reflux despite H2 blockade, likely requiring escalation to PPI therapy. She is also due for surveillance colonoscopy. Her last colonoscopy 5 years ago revealed a 4 mm tubular adenoma in the transverse colon, with a prior colonoscopy 12 years ago showing a 5 mm tubular adenoma.     Plan:  -GERD:  > Start pantoprazole 40 mg once daily for 1-2 months.  > EGD  -History of colon polyps:  > Surveillance colonoscopy within the next 1-2 months per guidelines.  > Suprep ordered for bowel preparation.      Case reviewed and discussed with attending hepatologist Seth Vázquez MD.    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    Hyperopia of both eyes with astigmatism and presbyopia    Other conditions influencing health status     Arthritis    Personal history of other diseases of the circulatory system     History of cardiac murmur    Personal history of other endocrine, nutritional and metabolic disease     History of thyroid disease    Pure hypercholesterolemia, unspecified     High cholesterol   [2]   Past Surgical History:  Procedure Laterality Date    MR HEAD ANGIO WO IV CONTRAST  12/27/2012    MR HEAD ANGIO WO IV CONTRAST 12/27/2012 [facility]    OTHER SURGICAL HISTORY  09/08/2014    History Of Prior Surgery    TONSILLECTOMY  08/10/2015    Tonsillectomy   [3]   Family History  Problem Relation Name Age of Onset    Diabetes Sister      Diabetes Brother      Diabetes Sibling     [4]   Allergies  Allergen Reactions    Iodinated Contrast Media Anaphylaxis    Propoxyphene Unknown, Anaphylaxis and Other    Propoxyphene Hcl Anaphylaxis    Barium Sulfate Unknown and Other    Lisinopril Other and GI Upset     Gastrointestinal upset

## 2025-06-06 DIAGNOSIS — M25.511 CHRONIC RIGHT SHOULDER PAIN: ICD-10-CM

## 2025-06-06 DIAGNOSIS — G89.29 CHRONIC RIGHT SHOULDER PAIN: ICD-10-CM

## 2025-06-06 RX ORDER — CYCLOBENZAPRINE HCL 10 MG
10 TABLET ORAL NIGHTLY PRN
Qty: 60 TABLET | Refills: 0 | Status: SHIPPED | OUTPATIENT
Start: 2025-06-06 | End: 2025-08-05

## 2025-06-11 ENCOUNTER — OFFICE VISIT (OUTPATIENT)
Dept: ORTHOPEDIC SURGERY | Facility: CLINIC | Age: 80
End: 2025-06-11
Payer: MEDICARE

## 2025-06-11 DIAGNOSIS — M19.011 ARTHRITIS OF RIGHT SHOULDER REGION: Primary | ICD-10-CM

## 2025-06-11 DIAGNOSIS — M25.511 RIGHT SHOULDER PAIN, UNSPECIFIED CHRONICITY: ICD-10-CM

## 2025-06-11 PROCEDURE — 1159F MED LIST DOCD IN RCRD: CPT | Performed by: STUDENT IN AN ORGANIZED HEALTH CARE EDUCATION/TRAINING PROGRAM

## 2025-06-11 PROCEDURE — 99212 OFFICE O/P EST SF 10 MIN: CPT | Performed by: STUDENT IN AN ORGANIZED HEALTH CARE EDUCATION/TRAINING PROGRAM

## 2025-06-11 ASSESSMENT — PAIN - FUNCTIONAL ASSESSMENT: PAIN_FUNCTIONAL_ASSESSMENT: 0-10

## 2025-06-11 NOTE — PROGRESS NOTES
CHIEF COMPLAINT:   No chief complaint on file.    History: 80 y.o. female returns the office today for follow-up of her right shoulder.  We gave her an injection about 7 months ago.  She says it is actually still feeling well.    3/12/25: returns the office today for follow-up of her right shoulder.  We gave her an injection about 4 months ago.  She says it is actually still feeling well.    11/13/24: returns to the office for follow-up of her right shoulder.  She has known shoulder arthritis.  We gave her an injection about 5 months ago.  Unfortunately it is starting to wear off.    9/3/24: returns the office today for follow-up of her right shoulder.  She has known shoulder arthritis.  We gave her injection 3 months ago.  He is doing very well.  In fact, she really is not having much pain today.  Able to use the shoulder as tolerated.    6/4/24: presents to the office today for evaluation of her right shoulder.  The patient is right-hand dominant.  She is no longer working.  She states that she actually injured her shoulder at her previous job about a year and a half ago when she fell directly onto the right shoulder.  Since that time she has had extensive physical therapy.  She actually is in therapy again has not been in therapy for more than 3 months at this point.  Continues to have pain and mechanical symptoms of the right shoulder.  States that the shoulder locks sometimes.  Not had any injections.  Pain is really deep and anterior.  Also has some cervical complaints today.      Past medical history, past surgical history, medications, allergies, family history, social history, and review of systems were reviewed today.    A 12 point review of systems was negative other than as stated in the HPI.    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    Hyperopia of both eyes with astigmatism and presbyopia    Other conditions influencing health status     Arthritis    Personal history of other diseases of the circulatory system     History of cardiac murmur    Personal history of other endocrine, nutritional and metabolic disease     History of thyroid disease    Pure hypercholesterolemia, unspecified     High cholesterol        Allergies   Allergen Reactions    Iodinated Contrast Media Anaphylaxis    Propoxyphene Unknown, Anaphylaxis and Other    Propoxyphene Hcl Anaphylaxis    Barium Sulfate Unknown and Other    Lisinopril Other and GI Upset     Gastrointestinal upset        Past Surgical History:   Procedure Laterality Date    MR HEAD ANGIO WO IV CONTRAST  12/27/2012    MR HEAD ANGIO WO IV CONTRAST 12/27/2012 [facility]    OTHER SURGICAL HISTORY  09/08/2014    History Of Prior Surgery    TONSILLECTOMY  08/10/2015    Tonsillectomy        Family History   Problem Relation Name Age of Onset    Diabetes Sister      Diabetes Brother      Diabetes Sibling          Social History     Socioeconomic History    Marital status:      Spouse name: Not on file    Number of children: Not on file    Years of education: Not on file    Highest education level: Not on file   Occupational History    Not on file   Tobacco Use    Smoking status: Former     Types: Cigarettes     Passive exposure: Past    Smokeless tobacco: Never   Vaping Use    Vaping status: Never Used   Substance and Sexual Activity    Alcohol use: Not Currently    Drug use: Never    Sexual activity: Not Currently   Other Topics Concern    Not on file   Social History Narrative    Not on file     Social Drivers of Health     Financial Resource Strain: Not on file   Food Insecurity: Unknown (2/6/2024)    Received from [facility]    Hunger Vital Sign     Worried About Running Out of Food in the Last Year: Never true     Ran Out of Food in the Last Year:  Not on file   Transportation Needs: Not on file   Physical Activity: Not on file   Stress: Not on file   Social Connections: Not on file   Intimate Partner Violence: Not on file   Housing Stability: Not on file        CURRENT MEDICATIONS:   Current Outpatient Medications   Medication Sig Dispense Refill    acetaminophen (Tylenol 8 Hour) 650 mg ER tablet Take by mouth. Use as directed      ascorbic acid (Vitamin C) 1,000 mg tablet Take 1 tablet (1,000 mg) by mouth once daily.      Autolet (OneTouch Delica Plus Lanc Dev) lancing device For daily BG check 1 each 0    blood sugar diagnostic (Blood Glucose Test) strip For daily BG check 100 strip 5    blood-glucose meter misc For blood sugar checks 1 each 0    calcium carb/vitamin D3/vit K1 (CALCIUM SOFT CHEW ORAL) Take 1 capsule by mouth once daily.      calcium carbonate 600 mg calcium (1,500 mg) tablet Take 1 tablet (600 mg) by mouth once daily. (Patient not taking: Reported on 5/7/2025)      cholecalciferol (Vitamin D3) 5,000 Units tablet Take 1 tablet (5,000 Units) by mouth once daily. 90 tablet 3    cyclobenzaprine (Flexeril) 10 mg tablet Take 1 tablet (10 mg) by mouth as needed at bedtime for muscle spasms. 60 tablet 0    famotidine (Pepcid) 40 mg tablet Take 1 tablet (40 mg) by mouth 2 times a day. 180 tablet 2    fluticasone (Flonase) 50 mcg/actuation nasal spray Administer 1 spray into each nostril once daily. 16 g 2    lancets (Lancets,Ultra Thin) misc For daily BG checks 100 each 2    losartan-hydrochlorothiazide (Hyzaar) 50-12.5 mg tablet Take 1 tablet by mouth once daily. 90 tablet 1    multivitamin (One Daily Multivitamin) tablet Take 1 tablet by mouth once daily.      NON FORMULARY Take 1 each by mouth once daily. Probiotic green tea      oxymetazoline (Afrin) 0.05 % nasal spray Administer 2 sprays into each nostril every 12 hours if needed (for nose bleeding) for up to 2 days. Do not use for more than 3 days. 30 mL 0    pantoprazole (ProtoNix) 40 mg EC  "tablet Take 1 tablet (40 mg) by mouth once daily. Do not crush, chew, or split. 30 tablet 1    psyllium (Metamucil) 3.4 gram packet Take 1 packet by mouth once daily. Mix and drink with at least 8 ounces of water or juice. (Patient not taking: Reported on 5/7/2025) 30 packet 3    simvastatin (Zocor) 20 mg tablet Take 1 tablet (20 mg) by mouth once daily at bedtime. 90 tablet 3    sodium chloride (Ocean) 0.65 % nasal spray Administer 1 spray into each nostril if needed for congestion (and dry nose). (Patient not taking: Reported on 3/17/2025) 30 mL 12    sodium chloride-Aloe vera gel (Ayr Saline) gel topical gel Apply 1 Application to affected nostril(s) if needed (dry nose). (Patient not taking: Reported on 5/7/2025) 14.1 g 2    zinc sulfate (Zincate) 220 (50 Zn) MG capsule Take 1 capsule (50 mg of elemental zinc) by mouth once daily.       Current Facility-Administered Medications   Medication Dose Route Frequency Provider Last Rate Last Admin    sodium,potassium,mag sulfates (Suprep) solution recon soln 1 bottle  1 bottle oral Once Seth Vázquez MD           Physical Examination:      10/23/2024    11:44 AM 10/30/2024     2:04 PM 12/12/2024     2:29 PM 1/9/2025     1:20 PM 3/17/2025     2:50 PM 4/21/2025     2:06 PM 5/7/2025    10:44 AM   Vitals   Systolic  118 144 153 116 144 141   Diastolic  75 92 95 77 83 89   BP Location    Right arm Left arm  Right arm   Heart Rate  75 84 73 76 64 71   Temp  36.3 °C (97.4 °F) 36.6 °C (97.9 °F) 36.4 °C (97.5 °F)  36.3 °C (97.4 °F) 36.5 °C (97.7 °F)   Resp  16 28   16 18   Height 1.575 m (5' 2\") 1.575 m (5' 2\")  1.575 m (5' 2\") 1.575 m (5' 2\") 1.575 m (5' 2\")    Weight (lb) 241 242 243.2 244 242.4 246 241   BMI 44.08 kg/m2 44.26 kg/m2 44.48 kg/m2 44.63 kg/m2 44.34 kg/m2 44.99 kg/m2 44.08 kg/m2   BSA (m2) 2.18 m2 2.19 m2 2.19 m2 2.2 m2 2.19 m2 2.21 m2 2.18 m2   Visit Report Report Report Report Report Report Report Report      There is no height or weight on file to calculate " BMI.    Well-appearing, appears stated age, pleasant and cooperative, appropriate mood and behavior. Height and weight reviewed. Alert and oriented x3.  Auditory function intact.  No acute distress.  Intact ocular function, LILIYA, EOMI. Breathing is unlabored .  There is no evidence of jugular venous distension. Skin appearance is normal without evidence of rash or other lesions. 2+ radial pulses bilaterally, fingers pink and wwp, good capillary refill, no pitting edema. No appreciable lymphadenopathy in bilateral upper extremities. SILT throughout both upper extremities, median/radial/ulnar/musculocutaneous/axillary nerve motor and sensory intact (except for abnormalities noted in focused musculoskeletal exam section below).     Neck exam: Full range of motion of the neck in flexion/extension and rotational movements. No significant areas of tenderness to palpation in the neck.    On exam of bilateral upper extremities, she has she has relatively preserved range of motion of the right shoulder in forward flexion, active forward flexion 130 compared to 140 on the left, external rotation though is limited, 10 degrees on the right compared to 30 on the left.  Internal rotation to the side on the right compared to L5 on the left.  Rotator cuff strength is preserved but painful on the right side.    Imaging: Radiographs of the right shoulder were reviewed today.  First interpreted by myself.  Findings consistent with glenohumeral arthritis with loss of glenohumeral joint space and osteophyte formation.  Humeral head is centered on the axillary view.    Assessment: Right shoulder osteoarthritis    Plan: Patient's symptoms, exam and imaging findings are consistent with end stage glenohumeral osteoarthritis.  We discussed the natural history of this.  We discussed that with shoulder osteoarthritis, there is loss of cartilage on both the humerus and glenoid, bony erosion of the glenoid, and extra bone formation called  osteophytes on the humerus and glenoid.  We discussed that there are different patterns of arthritis.  Conservative treatment for glenohumeral arthritis is limited, and can include activity modification, injections and physical therapy, although with end stage bone on bone arthritis the benefits of physical therapy may be limited. The final option would be to have a shoulder replacement. After discussion, the patient responded well to the cortisone injection and it is still working.  She will come back in a few months for repeat injection.            Dragon software was used to dictate this note, please be aware that minor errors in transcription may be present.    Glen Karimi MD    Shoulder/Elbow Surgery  [facility]

## 2025-06-18 ENCOUNTER — APPOINTMENT (OUTPATIENT)
Dept: OTOLARYNGOLOGY | Facility: HOSPITAL | Age: 80
End: 2025-06-18
Payer: MEDICARE

## 2025-07-17 ENCOUNTER — APPOINTMENT (OUTPATIENT)
Dept: ENDOCRINOLOGY | Facility: CLINIC | Age: 80
End: 2025-07-17
Payer: MEDICARE

## 2025-08-20 ENCOUNTER — APPOINTMENT (OUTPATIENT)
Dept: PRIMARY CARE | Facility: CLINIC | Age: 80
End: 2025-08-20
Payer: MEDICARE

## 2025-09-12 ENCOUNTER — APPOINTMENT (OUTPATIENT)
Dept: ORTHOPEDIC SURGERY | Facility: CLINIC | Age: 80
End: 2025-09-12
Payer: MEDICARE

## 2025-09-22 ENCOUNTER — APPOINTMENT (OUTPATIENT)
Dept: CARDIOLOGY | Facility: HOSPITAL | Age: 80
End: 2025-09-22
Payer: MEDICARE

## 2025-11-21 ENCOUNTER — APPOINTMENT (OUTPATIENT)
Dept: OPHTHALMOLOGY | Facility: CLINIC | Age: 80
End: 2025-11-21
Payer: MEDICARE